# Patient Record
Sex: FEMALE | Race: WHITE
[De-identification: names, ages, dates, MRNs, and addresses within clinical notes are randomized per-mention and may not be internally consistent; named-entity substitution may affect disease eponyms.]

---

## 2017-03-29 ENCOUNTER — HOSPITAL ENCOUNTER (OUTPATIENT)
Dept: HOSPITAL 75 - RAD | Age: 37
End: 2017-03-29
Attending: OBSTETRICS & GYNECOLOGY
Payer: MEDICAID

## 2017-03-29 DIAGNOSIS — Z3A.35: ICD-10-CM

## 2017-03-29 DIAGNOSIS — O36.63X0: Primary | ICD-10-CM

## 2017-03-29 DIAGNOSIS — O09.523: ICD-10-CM

## 2017-03-29 PROCEDURE — 76805 OB US >/= 14 WKS SNGL FETUS: CPT

## 2017-03-29 NOTE — DIAGNOSTIC IMAGING REPORT
INDICATION: Assess growth.



A person gestation is transversely positioned head to the

maternal right. The placenta is fundal with no abruption or

previa. The amniotic fluid volume is within normal limits.

Cervix nondilated normal in length at 5.5 cm. Heart rate regular

at 147 beats per minute. Biometric measurements correlate with

an average age of 35 week 4 days. Age based on initial

ultrasound 34 week one day.



IMPRESSION:

Person gestation transversely positioned today measures 35

week 4 days. Nondilated cervix. No pathological finding.



Dictated by:



Dictated on workstation # [id number]

## 2017-04-26 ENCOUNTER — HOSPITAL ENCOUNTER (OUTPATIENT)
Dept: HOSPITAL 75 - RAD | Age: 37
End: 2017-04-26
Attending: OBSTETRICS & GYNECOLOGY
Payer: MEDICAID

## 2017-04-26 DIAGNOSIS — Z3A.38: ICD-10-CM

## 2017-04-26 DIAGNOSIS — O40.3XX1: Primary | ICD-10-CM

## 2017-04-26 PROCEDURE — 76805 OB US >/= 14 WKS SNGL FETUS: CPT

## 2017-04-26 PROCEDURE — 76819 FETAL BIOPHYS PROFIL W/O NST: CPT

## 2017-04-26 NOTE — DIAGNOSTIC IMAGING REPORT
OB ultrasound. Biophysical profile.



INDICATION: Advanced maternal age.



FINDINGS:

Fetal heart rate is 158 beats per minute. Fetal presentation is

cephalic. Total FRANK is 29.8 cm.



The growth parameters are:



Biparietal diameter:              37 weeks 6 days

Head circumference:           38 weeks 5 day

Abdominal circumference:  40 weeks 2 days

Femur length:                       38 weeks 1 day



These average at:                38 weeks and 6 days. This

correlates with a gestational age of 38 weeks and 1 day based on

the provided ETTA of 05/09/2017. The abdominal circumference is

at the 98th percentile.



Estimated fetal weight is 3.7 kg. There is question of

dilatation of the stomach based on image #6. This study was not

dedicated for evaluation of the stomach.



IMPRESSION: Polyhydramnios. There is question of dilated stomach

incidentally noted. This is, however, not well evaluated on this

exam which is not dedicated to evaluate fetal anatomy. In the

presence of polyhydramnios and dilated stomach, anomaly such as

duodenal atresia could be considered. Correlate clinically and

if needed with dedicated ultrasound study to evaluate the

abdominal structures.



Dictated by:



Dictated on workstation # [id number]

## 2017-05-01 ENCOUNTER — HOSPITAL ENCOUNTER (INPATIENT)
Dept: HOSPITAL 75 - LDRP | Age: 37
LOS: 2 days | Discharge: HOME | End: 2017-05-03
Attending: OBSTETRICS & GYNECOLOGY | Admitting: OBSTETRICS & GYNECOLOGY
Payer: MEDICAID

## 2017-05-01 VITALS — BODY MASS INDEX: 33.8 KG/M2 | WEIGHT: 198 LBS | HEIGHT: 64 IN

## 2017-05-01 VITALS — SYSTOLIC BLOOD PRESSURE: 114 MMHG | DIASTOLIC BLOOD PRESSURE: 85 MMHG

## 2017-05-01 DIAGNOSIS — Z3A.39: ICD-10-CM

## 2017-05-01 DIAGNOSIS — Z23: ICD-10-CM

## 2017-05-01 DIAGNOSIS — O36.63X0: ICD-10-CM

## 2017-05-01 DIAGNOSIS — O09.523: ICD-10-CM

## 2017-05-01 DIAGNOSIS — E03.9: ICD-10-CM

## 2017-05-01 DIAGNOSIS — O40.3XX0: Primary | ICD-10-CM

## 2017-05-01 DIAGNOSIS — O08.83: ICD-10-CM

## 2017-05-01 LAB
BASOPHILS # BLD AUTO: 0 10^3/UL (ref 0–0.1)
BASOPHILS NFR BLD AUTO: 0 % (ref 0–10)
BILIRUB UR QL STRIP: NEGATIVE
EOSINOPHIL # BLD AUTO: 0.2 10^3/UL (ref 0–0.3)
EOSINOPHIL NFR BLD AUTO: 1 % (ref 0–10)
ERYTHROCYTE [DISTWIDTH] IN BLOOD BY AUTOMATED COUNT: 12.4 % (ref 10–14.5)
KETONES UR QL STRIP: NEGATIVE
LEUKOCYTE ESTERASE UR QL STRIP: (no result)
LYMPHOCYTES # BLD AUTO: 2.9 X 10^3 (ref 1–4)
LYMPHOCYTES NFR BLD AUTO: 25 % (ref 12–44)
MCH RBC QN AUTO: 32 PG (ref 25–34)
MCHC RBC AUTO-ENTMCNC: 34 G/DL (ref 32–36)
MCV RBC AUTO: 93 FL (ref 80–99)
MONOCYTES # BLD AUTO: 0.8 X 10^3 (ref 0–1)
MONOCYTES NFR BLD AUTO: 6 % (ref 0–12)
NEUTROPHILS # BLD AUTO: 8.1 X 10^3 (ref 1.8–7.8)
NEUTROPHILS NFR BLD AUTO: 68 % (ref 42–75)
NITRITE UR QL STRIP: NEGATIVE
PH UR STRIP: 6 [PH] (ref 5–9)
PLATELET # BLD: 273 10^3/UL (ref 130–400)
PMV BLD AUTO: 11.8 FL (ref 7.4–10.4)
PROT UR QL STRIP: NEGATIVE
RBC # BLD AUTO: 3.86 10^6/UL (ref 4.35–5.85)
SP GR UR STRIP: 1.02 (ref 1.02–1.02)
SQUAMOUS #/AREA URNS HPF: (no result) /HPF
UROBILINOGEN UR-MCNC: NORMAL MG/DL
WBC # BLD AUTO: 12 10^3/UL (ref 4.3–11)
WBC #/AREA URNS HPF: (no result) /HPF

## 2017-05-01 PROCEDURE — 87077 CULTURE AEROBIC IDENTIFY: CPT

## 2017-05-01 PROCEDURE — 84443 ASSAY THYROID STIM HORMONE: CPT

## 2017-05-01 PROCEDURE — 85025 COMPLETE CBC W/AUTO DIFF WBC: CPT

## 2017-05-01 PROCEDURE — 90715 TDAP VACCINE 7 YRS/> IM: CPT

## 2017-05-01 PROCEDURE — 36415 COLL VENOUS BLD VENIPUNCTURE: CPT

## 2017-05-01 PROCEDURE — 82962 GLUCOSE BLOOD TEST: CPT

## 2017-05-01 PROCEDURE — 86900 BLOOD TYPING SEROLOGIC ABO: CPT

## 2017-05-01 PROCEDURE — 86901 BLOOD TYPING SEROLOGIC RH(D): CPT

## 2017-05-01 PROCEDURE — 3E0DXGC INTRODUCTION OF OTHER THERAPEUTIC SUBSTANCE INTO MOUTH AND PHARYNX, EXTERNAL APPROACH: ICD-10-PCS | Performed by: OBSTETRICS & GYNECOLOGY

## 2017-05-01 PROCEDURE — 86850 RBC ANTIBODY SCREEN: CPT

## 2017-05-01 PROCEDURE — 87186 SC STD MICRODIL/AGAR DIL: CPT

## 2017-05-01 PROCEDURE — 87088 URINE BACTERIA CULTURE: CPT

## 2017-05-01 PROCEDURE — 88307 TISSUE EXAM BY PATHOLOGIST: CPT

## 2017-05-01 PROCEDURE — 81000 URINALYSIS NONAUTO W/SCOPE: CPT

## 2017-05-01 RX ADMIN — SODIUM CHLORIDE, SODIUM LACTATE, POTASSIUM CHLORIDE, AND CALCIUM CHLORIDE SCH MLS/HR: 600; 310; 30; 20 INJECTION, SOLUTION INTRAVENOUS at 20:20

## 2017-05-02 VITALS — DIASTOLIC BLOOD PRESSURE: 60 MMHG | SYSTOLIC BLOOD PRESSURE: 98 MMHG

## 2017-05-02 VITALS — DIASTOLIC BLOOD PRESSURE: 65 MMHG | SYSTOLIC BLOOD PRESSURE: 110 MMHG

## 2017-05-02 VITALS — DIASTOLIC BLOOD PRESSURE: 65 MMHG | SYSTOLIC BLOOD PRESSURE: 112 MMHG

## 2017-05-02 VITALS — DIASTOLIC BLOOD PRESSURE: 70 MMHG | SYSTOLIC BLOOD PRESSURE: 117 MMHG

## 2017-05-02 VITALS — DIASTOLIC BLOOD PRESSURE: 54 MMHG | SYSTOLIC BLOOD PRESSURE: 92 MMHG

## 2017-05-02 VITALS — SYSTOLIC BLOOD PRESSURE: 102 MMHG | DIASTOLIC BLOOD PRESSURE: 69 MMHG

## 2017-05-02 VITALS — DIASTOLIC BLOOD PRESSURE: 82 MMHG | SYSTOLIC BLOOD PRESSURE: 115 MMHG

## 2017-05-02 VITALS — DIASTOLIC BLOOD PRESSURE: 63 MMHG | SYSTOLIC BLOOD PRESSURE: 105 MMHG

## 2017-05-02 VITALS — SYSTOLIC BLOOD PRESSURE: 103 MMHG | DIASTOLIC BLOOD PRESSURE: 70 MMHG

## 2017-05-02 VITALS — DIASTOLIC BLOOD PRESSURE: 73 MMHG | SYSTOLIC BLOOD PRESSURE: 111 MMHG

## 2017-05-02 VITALS — DIASTOLIC BLOOD PRESSURE: 65 MMHG | SYSTOLIC BLOOD PRESSURE: 105 MMHG

## 2017-05-02 VITALS — DIASTOLIC BLOOD PRESSURE: 76 MMHG | SYSTOLIC BLOOD PRESSURE: 112 MMHG

## 2017-05-02 VITALS — DIASTOLIC BLOOD PRESSURE: 65 MMHG | SYSTOLIC BLOOD PRESSURE: 100 MMHG

## 2017-05-02 VITALS — DIASTOLIC BLOOD PRESSURE: 69 MMHG | SYSTOLIC BLOOD PRESSURE: 97 MMHG

## 2017-05-02 VITALS — DIASTOLIC BLOOD PRESSURE: 61 MMHG | SYSTOLIC BLOOD PRESSURE: 103 MMHG

## 2017-05-02 VITALS — DIASTOLIC BLOOD PRESSURE: 69 MMHG | SYSTOLIC BLOOD PRESSURE: 112 MMHG

## 2017-05-02 VITALS — SYSTOLIC BLOOD PRESSURE: 102 MMHG | DIASTOLIC BLOOD PRESSURE: 56 MMHG

## 2017-05-02 VITALS — DIASTOLIC BLOOD PRESSURE: 59 MMHG | SYSTOLIC BLOOD PRESSURE: 107 MMHG

## 2017-05-02 VITALS — SYSTOLIC BLOOD PRESSURE: 101 MMHG | DIASTOLIC BLOOD PRESSURE: 67 MMHG

## 2017-05-02 VITALS — SYSTOLIC BLOOD PRESSURE: 100 MMHG | DIASTOLIC BLOOD PRESSURE: 65 MMHG

## 2017-05-02 VITALS — DIASTOLIC BLOOD PRESSURE: 69 MMHG | SYSTOLIC BLOOD PRESSURE: 118 MMHG

## 2017-05-02 VITALS — SYSTOLIC BLOOD PRESSURE: 104 MMHG | DIASTOLIC BLOOD PRESSURE: 62 MMHG

## 2017-05-02 VITALS — SYSTOLIC BLOOD PRESSURE: 112 MMHG | DIASTOLIC BLOOD PRESSURE: 77 MMHG

## 2017-05-02 VITALS — SYSTOLIC BLOOD PRESSURE: 111 MMHG | DIASTOLIC BLOOD PRESSURE: 71 MMHG

## 2017-05-02 VITALS — DIASTOLIC BLOOD PRESSURE: 70 MMHG | SYSTOLIC BLOOD PRESSURE: 99 MMHG

## 2017-05-02 VITALS — SYSTOLIC BLOOD PRESSURE: 123 MMHG | DIASTOLIC BLOOD PRESSURE: 83 MMHG

## 2017-05-02 VITALS — SYSTOLIC BLOOD PRESSURE: 118 MMHG | DIASTOLIC BLOOD PRESSURE: 85 MMHG

## 2017-05-02 VITALS — SYSTOLIC BLOOD PRESSURE: 103 MMHG | DIASTOLIC BLOOD PRESSURE: 64 MMHG

## 2017-05-02 VITALS — DIASTOLIC BLOOD PRESSURE: 70 MMHG | SYSTOLIC BLOOD PRESSURE: 110 MMHG

## 2017-05-02 VITALS — SYSTOLIC BLOOD PRESSURE: 82 MMHG | DIASTOLIC BLOOD PRESSURE: 44 MMHG

## 2017-05-02 VITALS — SYSTOLIC BLOOD PRESSURE: 104 MMHG | DIASTOLIC BLOOD PRESSURE: 69 MMHG

## 2017-05-02 VITALS — SYSTOLIC BLOOD PRESSURE: 121 MMHG | DIASTOLIC BLOOD PRESSURE: 72 MMHG

## 2017-05-02 VITALS — SYSTOLIC BLOOD PRESSURE: 100 MMHG | DIASTOLIC BLOOD PRESSURE: 64 MMHG

## 2017-05-02 VITALS — DIASTOLIC BLOOD PRESSURE: 72 MMHG | SYSTOLIC BLOOD PRESSURE: 107 MMHG

## 2017-05-02 VITALS — DIASTOLIC BLOOD PRESSURE: 69 MMHG | SYSTOLIC BLOOD PRESSURE: 106 MMHG

## 2017-05-02 VITALS — DIASTOLIC BLOOD PRESSURE: 67 MMHG | SYSTOLIC BLOOD PRESSURE: 125 MMHG

## 2017-05-02 RX ADMIN — IBUPROFEN SCH MG: 600 TABLET ORAL at 14:02

## 2017-05-02 RX ADMIN — IBUPROFEN SCH MG: 600 TABLET ORAL at 21:22

## 2017-05-02 RX ADMIN — AMOXICILLIN AND CLAVULANATE POTASSIUM SCH MG: 875; 125 TABLET, FILM COATED ORAL at 16:46

## 2017-05-02 RX ADMIN — WATER SCH MLS/HR: 1 INJECTION INTRAMUSCULAR; INTRAVENOUS; SUBCUTANEOUS at 01:22

## 2017-05-02 RX ADMIN — WATER SCH MLS/HR: 1 INJECTION INTRAMUSCULAR; INTRAVENOUS; SUBCUTANEOUS at 04:47

## 2017-05-02 RX ADMIN — SODIUM CHLORIDE, SODIUM LACTATE, POTASSIUM CHLORIDE, AND CALCIUM CHLORIDE SCH MLS/HR: 600; 310; 30; 20 INJECTION, SOLUTION INTRAVENOUS at 05:47

## 2017-05-02 RX ADMIN — WATER SCH MLS/HR: 1 INJECTION INTRAMUSCULAR; INTRAVENOUS; SUBCUTANEOUS at 09:27

## 2017-05-02 RX ADMIN — DOCUSATE SODIUM SCH MG: 100 CAPSULE ORAL at 21:22

## 2017-05-02 RX ADMIN — FENTANYL CITRATE PRN MCG: 50 INJECTION, SOLUTION INTRAMUSCULAR; INTRAVENOUS at 04:46

## 2017-05-02 RX ADMIN — FENTANYL CITRATE PRN MCG: 50 INJECTION, SOLUTION INTRAMUSCULAR; INTRAVENOUS at 01:22

## 2017-05-02 NOTE — DISCHARGE INST-WOMEN'S SERVICE
Discharge Inst-Women's Serv


Depart Medication/Instructions


New, Converted or Re-Newed RX:  RX on Chart


Final Diagnosis


polyhydramnios


LGA


AMA


induction at 39 weeks


vaginal delivery


epidural





Consults/Follow Up


Additional Follow Up:  Yes (2 weeks (if doing well can call to cancel this appt

) and 6 weeks)





Activity


Activity:  Activity as Tolerated


Driving Instructions:  You May Drive


NO SMOKING:  NO SMOKING


Nothing Inside Vagina:  No Douching, No Houck, No Tampons





Diet


Discharge Diet:  No Restrictions


Symptoms to Report to :  Swelling Increased, Bleeding Excessive, Fever Over 

101 Degrees F, Vaginal Bleeding Increase, Cramps in Feet or Legs, Vaginal 

Discharge Foul


For Any Problems or Questions:  Contact Your Physician





Skin/Wound Care


Bathing Instructions:  NELDA Hernandez DO May 2, 2017 11:43 am

## 2017-05-02 NOTE — OB LABOR & DELIVERY RECORD
Vag Delivery Note


Vag Delivery Note


Date of Delivery: 17 





Preoperative Diagnosis: Gallo Will  is a 35  /Para  4 /2 ,Gestational 

Age 39 weeks, GBS +, polyhydramnios (30), AMA, LGA


Postoperative Diagnosis: Same


Surgeon: NELDA MCKAY 








Anesthesia: epidrual





Delivery Type: vaginal 





Findings: []


Viable femal infant, apgars 8/9, weight 7#9oz


Lacerations:


Intact placenta with 3 vessel cord. No nuchal cord,  or shoulder dystocia.  The 

was a body/bandoleer cord over the left shoulder. 








Estimated Blood Loss: 150 ml





Complications: None





Condition: Stable





Description of Procedure:


The patient is a 36  /Para  4 /2 ,Gestational Age 39 weeks, GBS +, 

polyhydramnios (30), AMA, LGAwho presented for induction of labor on 17.  

She received oral misoprostel x 1 and ampicillin was started for GBS 

prophylaxis (she received 4 total doses). She was admitted and informed consent 

was obtained. Her labor course was remarkable for misoprostol x 1, Ampicillin x 

4 and then spontaneous labor.  She had epidural placement at approximately 0715 

am and was 7 cm dilated after the epidural.  She had resultant hypotension with 

late decelerations after the epidural placement.  She had AROM at 9 + cm with a 

large amount of fluid.   She progressed to complete dilatation and began to 

push. 





She was then set up for delivery. The infant's head was delivered 

atraumatically in the DANE position. The shoulders and remainder of the infant's 

body were then delivered without difficulty. There was a body cord that was 

delivered through.  Upon delivery, the head was held below the level of the 

perineum and the mouth and nares were bulb suctioned. The cord was doubly 

clamped and cut and the infant was handed off to the pediatric staff. An intact 

placenta with 3-vessel cord delivered via Charo and there was found to be 

minimal bleeding.  There was 1500 + ml of amniotic fluid that delivered after 

delivery of the baby.~ Vigorous fundal massage was performed and the fundus was 

found to be firm. IV oxytocin was given. Examination of the vagina and perineum 

revealed a superficial supraurethral laceration that was not repaired. 

Following delivery, sponge, instrument and needle counts were correct. Mom and 

baby were both in stable condition in the labor suite. placenta sent for 

pathology





Vitals - Labs


Vital Signs - I&O





Vital Signs








  Date Time  Temp Pulse Resp B/P (MAP) Pulse Ox O2 Delivery O2 Flow Rate FiO2


 


17 09:25  65 20 112/77 97 Room Air  


 


17 09:20  64 20 112/69 97 Room Air  


 


17 09:15  67 20 121/72 98 Room Air  


 


17 09:07  91 20 118/85 99 Room Air  


 


17 09:00  65 20 115/82 100 Room Air  


 


17 08:55  63 20 111/73 100 Room Air  


 


17 08:50  63 20 106/69 100 Room Air  


 


17 08:45  62 20 105/63 100 Room Air  


 


17 08:40  67 20 110/70 100 Room Air  


 


17 08:35  55 20 97/69 100 Room Air  


 


17 08:30  81 20 99/70 100 Room Air  


 


17 08:25  58 20 101/67 100 Room Air  


 


17 08:20  54 18 100/65 100 Room Air  


 


17 08:15  57 18 107/72 100 Room Air  


 


17 08:10  59 18 104/69 100 Room Air  


 


17 08:05  62 18 100/65 100 Room Air  


 


17 08:00 97.4 82 20 102/69  Room Air  


 


17 07:53  93 18 82/44 98 Room Air  


 


17 07:48  67 20 98/60 98 Room Air  


 


17 07:45  67 20 92/54 98 Room Air  


 


17 07:42  74 18 103/61 98 Room Air  


 


17 07:36  75 18 125/67 98 Room Air  


 


17 07:32  73 18 123/83 98 Room Air  


 


17 05:00 96.5 77  112/65    


 


17 01:30 96.6 79  100/64    


 


17 20:30 97.0 96 18 114/85    














I & O 


 


 17





 07:00


 


Intake Total 2100 ml


 


Balance 2100 ml











Labs


Laboratory Tests


17 20:20: 


White Blood Count 12.0H, Red Blood Count 3.86L, Hemoglobin 12.3, Hematocrit 36, 

Mean Corpuscular Volume 93, Mean Corpuscular Hemoglobin 32, Mean Corpuscular 

Hemoglobin Concent 34, Red Cell Distribution Width 12.4, Platelet Count 273, 

Mean Platelet Volume 11.8H, Neutrophils (%) (Auto) 68, Lymphocytes (%) (Auto) 25

, Monocytes (%) (Auto) 6, Eosinophils (%) (Auto) 1, Basophils (%) (Auto) 0, 

Neutrophils # (Auto) 8.1H, Lymphocytes # (Auto) 2.9, Monocytes # (Auto) 0.8, 

Eosinophils # (Auto) 0.2, Basophils # (Auto) 0.0, Urine Color YELLOW, Urine 

Clarity SLIGHTLY CLOUDY, Urine pH 6, Urine Specific Gravity 1.020, Urine 

Protein NEGATIVE, Urine Glucose (UA) NEGATIVE, Urine Ketones NEGATIVE, Urine 

Nitrite NEGATIVE, Urine Bilirubin NEGATIVE, Urine Urobilinogen NORMAL, Urine 

Leukocyte Esterase 3+H, Urine RBC (Auto) 1+H, Urine RBC 0-2, Urine WBC 25-50H, 

Urine Squamous Epithelial Cells 25-50H, Urine Crystals NONE, Urine Bacteria 

MODERATEH, Urine Casts NONE, Urine Mucus NEGATIVE, Urine Culture Indicated NO


17 20:36: Glucometer 92





Microbiology


17 Urine Culture - Preliminary, Resulted


         Probable Enterococcus Species











NELDA MCKAY DO May 2, 2017 11:37 am

## 2017-05-03 VITALS — SYSTOLIC BLOOD PRESSURE: 120 MMHG | DIASTOLIC BLOOD PRESSURE: 77 MMHG

## 2017-05-03 VITALS — SYSTOLIC BLOOD PRESSURE: 101 MMHG | DIASTOLIC BLOOD PRESSURE: 66 MMHG

## 2017-05-03 VITALS — SYSTOLIC BLOOD PRESSURE: 111 MMHG | DIASTOLIC BLOOD PRESSURE: 79 MMHG

## 2017-05-03 LAB
BASOPHILS # BLD AUTO: 0 10^3/UL (ref 0–0.1)
BASOPHILS NFR BLD AUTO: 0 % (ref 0–10)
EOSINOPHIL # BLD AUTO: 0.2 10^3/UL (ref 0–0.3)
EOSINOPHIL NFR BLD AUTO: 2 % (ref 0–10)
ERYTHROCYTE [DISTWIDTH] IN BLOOD BY AUTOMATED COUNT: 12.3 % (ref 10–14.5)
LYMPHOCYTES # BLD AUTO: 2.9 X 10^3 (ref 1–4)
LYMPHOCYTES NFR BLD AUTO: 25 % (ref 12–44)
MCH RBC QN AUTO: 32 PG (ref 25–34)
MCHC RBC AUTO-ENTMCNC: 34 G/DL (ref 32–36)
MCV RBC AUTO: 93 FL (ref 80–99)
MONOCYTES # BLD AUTO: 0.8 X 10^3 (ref 0–1)
MONOCYTES NFR BLD AUTO: 7 % (ref 0–12)
NEUTROPHILS # BLD AUTO: 7.8 X 10^3 (ref 1.8–7.8)
NEUTROPHILS NFR BLD AUTO: 66 % (ref 42–75)
PLATELET # BLD: 224 10^3/UL (ref 130–400)
PMV BLD AUTO: 10.9 FL (ref 7.4–10.4)
RBC # BLD AUTO: 3.29 10^6/UL (ref 4.35–5.85)
WBC # BLD AUTO: 11.8 10^3/UL (ref 4.3–11)

## 2017-05-03 RX ADMIN — DOCUSATE SODIUM SCH MG: 100 CAPSULE ORAL at 08:31

## 2017-05-03 RX ADMIN — IBUPROFEN SCH MG: 600 TABLET ORAL at 04:01

## 2017-05-03 RX ADMIN — IBUPROFEN SCH MG: 600 TABLET ORAL at 10:23

## 2017-05-03 RX ADMIN — AMOXICILLIN AND CLAVULANATE POTASSIUM SCH MG: 875; 125 TABLET, FILM COATED ORAL at 08:32

## 2017-05-03 NOTE — ANESTHESIA-REGIONAL POST-OP
Regional


Patient Condition


Mental Status:  Alert, Oriented x3


Circulation:  Same as Pre-Op


Headache:  Absent


Sensation:  Full Recovery


Motor Block:  Absent





Post Op Complications


Complications


None





Follow Up Care/Instructions


Patient Instructions


None needed.





Anesthesia/Patient Condition


Patient is doing well, no complaints, stable vital signs, no apparent adverse 

anesthesia problems.   


No complications reported per nursing.


D/C home per [facility] Criteria:  No











FREDA CRESPO CRNA May 3, 2017 15:00

## 2017-05-03 NOTE — POSTPARTUM PROGRESS NOTE
Postpartum Note


Postpartum Note


Postpartum Day # 1





Subjective:


Patient is without complaints. Ambulating, voiding. Tolerating a regular diet 

without nausea or vomiting. Normal lochia. Pain is well controlled with oral 

pain medications. Bottle feeding. Desires d/c home today.





Objective:





 VS - Last 72 Hours, by Label








 5/1/17 5/2/17 5/2/17 5/2/17





 20:30 01:30 05:00 07:32


 


Temp 97.0 96.6 96.5 


 


Pulse 96 79 77 73


 


Resp 18   18


 


B/P (MAP) 114/85 100/64 112/65 123/83


 


Pulse Ox    98


 


O2 Delivery    Room Air


 


    





 5/2/17 5/2/17 5/2/17 5/2/17





 07:36 07:42 07:45 07:48


 


Pulse 75 74 67 67


 


Resp 18 18 20 20


 


B/P (MAP) 125/67 103/61 92/54 98/60


 


Pulse Ox 98 98 98 98


 


O2 Delivery Room Air Room Air Room Air Room Air





 5/2/17 5/2/17 5/2/17 5/2/17





 07:53 08:00 08:05 08:10


 


Temp  97.4  


 


Pulse 93 82 62 59


 


Resp 18 20 18 18


 


B/P (MAP) 82/44 102/69 100/65 104/69


 


Pulse Ox 98  100 100


 


O2 Delivery Room Air Room Air Room Air Room Air


 


    





 5/2/17 5/2/17 5/2/17 5/2/17





 08:15 08:20 08:25 08:30


 


Pulse 57 54 58 81


 


Resp 18 18 20 20


 


B/P (MAP) 107/72 100/65 101/67 99/70


 


Pulse Ox 100 100 100 100


 


O2 Delivery Room Air Room Air Room Air Room Air





 5/2/17 5/2/17 5/2/17 5/2/17





 08:35 08:40 08:45 08:50


 


Pulse 55 67 62 63


 


Resp 20 20 20 20


 


B/P (MAP) 97/69 110/70 105/63 106/69


 


Pulse Ox 100 100 100 100


 


O2 Delivery Room Air Room Air Room Air Room Air





 5/2/17 5/2/17 5/2/17 5/2/17





 08:55 09:00 09:07 09:15


 


Pulse 63 65 91 67


 


Resp 20 20 20 20


 


B/P (MAP) 111/73 115/82 118/85 121/72


 


Pulse Ox 100 100 99 98


 


O2 Delivery Room Air Room Air Room Air Room Air





 5/2/17 5/2/17 5/2/17 5/2/17





 09:20 09:25 09:30 09:45


 


Pulse 64 65 80 62


 


Resp 20 20 18 18


 


B/P (MAP) 112/69 112/77 103/70 104/62


 


Pulse Ox 97 97 98 94


 


O2 Delivery Room Air Room Air Room Air Room Air





 5/2/17 5/2/17 5/2/17 5/2/17





 10:00 10:15 10:30 10:45


 


Pulse 62 69 75 74


 


Resp 20 20 20 20


 


B/P (MAP) 103/64 107/59 105/65 110/65


 


Pulse Ox 94 98 97 97


 


O2 Delivery Room Air Room Air Room Air Room Air





 5/2/17 5/2/17 5/2/17 5/2/17





 11:00 11:28 11:42 11:55


 


Temp  97.6 98.1 98.0


 


Pulse 72 75 80 84


 


Resp 20 20 20 18


 


B/P (MAP) 117/70 102/56 112/76 118/69


 


O2 Delivery Room Air Room Air Room Air Room Air


 


    





 5/2/17 5/2/17 5/3/17 5/3/17





 16:00 20:50 00:20 04:01


 


Temp 97.5 97.8 97.1 96.5


 


Pulse 81 73 65 59


 


Resp 14 18 18 18


 


B/P (MAP)  111/71 101/66 120/77


 


Pulse Ox 96 97 96 97


 


O2 Delivery  Room Air Room Air Room Air











Physical Exam:


General - Alert and oriented, no apparent distress


Abdomen - Soft, appropriately tender to palpation, non-distended, fundus firm 

at umbilicus


Extremities - no edema, negative Papo's bilaterally 








Laboratory Tests








Test


  5/3/17


06:30 Range/Units


 


 


White Blood Count


  11.8 H


  4.3-11.0


10^3/uL


 


Red Blood Count


  3.29 L


  4.35-5.85


10^6/uL


 


Hemoglobin 10.4 L 11.5-16.0  G/DL


 


Hematocrit 31 L 35-52  %


 


Mean Corpuscular Volume 93  80-99  FL


 


Mean Corpuscular Hemoglobin 32  25-34  PG


 


Mean Corpuscular Hemoglobin


Concent 34 


  32-36  G/DL


 


 


Red Cell Distribution Width 12.3  10.0-14.5  %


 


Platelet Count


  224 


  130-400


10^3/uL


 


Mean Platelet Volume 10.9 H 7.4-10.4  FL


 


Neutrophils (%) (Auto) 66  42-75  %


 


Lymphocytes (%) (Auto) 25  12-44  %


 


Monocytes (%) (Auto) 7  0-12  %


 


Eosinophils (%) (Auto) 2  0-10  %


 


Basophils (%) (Auto) 0  0-10  %


 


Neutrophils # (Auto) 7.8  1.8-7.8  X 10^3


 


Lymphocytes # (Auto) 2.9  1.0-4.0  X 10^3


 


Monocytes # (Auto) 0.8  0.0-1.0  X 10^3


 


Eosinophils # (Auto)


  0.2 


  0.0-0.3


10^3/uL


 


Basophils # (Auto)


  0.0 


  0.0-0.1


10^3/uL


 


Thyroid Stimulating Hormone


(TSH) 4.55 


  0.35-4.94


UIU/ML











Assessment:


37 y/o post-partum day # 1, status post spontaneous vaginal delivery following 

term IOL for polyhydramnios/AMA.


Recovering well, hemodynamically stable


Acute blood loss anemia Hgb 10.4


Rh+


Hypothyroidism





Plan:


Routine postpartum care.


Encourage breast feeding.


Encourage ambulation.


Ferrous sulfate supplementation.


Restart synthroid.


Plan for discharge today, f/u with Dr. Sheppard as per discharge instructions





Vitals - Labs


Vital Signs - I&O





Vital Signs








  Date Time  Temp Pulse Resp B/P (MAP) Pulse Ox O2 Delivery O2 Flow Rate FiO2


 


5/3/17 04:01 96.5 59 18 120/77 97 Room Air  


 


5/3/17 00:20 97.1 65 18 101/66 96 Room Air  


 


5/2/17 20:50 97.8 73 18 111/71 97 Room Air  


 


5/2/17 16:00 97.5 81 14  96   


 


5/2/17 11:55 98.0 84 18 118/69  Room Air  


 


5/2/17 11:42 98.1 80 20 112/76  Room Air  


 


5/2/17 11:28 97.6 75 20 102/56  Room Air  


 


5/2/17 11:00  72 20 117/70  Room Air  


 


5/2/17 10:45  74 20 110/65 97 Room Air  


 


5/2/17 10:30  75 20 105/65 97 Room Air  


 


5/2/17 10:15  69 20 107/59 98 Room Air  


 


5/2/17 10:00  62 20 103/64 94 Room Air  


 


5/2/17 09:45  62 18 104/62 94 Room Air  


 


5/2/17 09:30  80 18 103/70 98 Room Air  


 


5/2/17 09:25  65 20 112/77 97 Room Air  


 


5/2/17 09:20  64 20 112/69 97 Room Air  


 


5/2/17 09:15  67 20 121/72 98 Room Air  


 


5/2/17 09:07  91 20 118/85 99 Room Air  


 


5/2/17 09:00  65 20 115/82 100 Room Air  


 


5/2/17 08:55  63 20 111/73 100 Room Air  


 


5/2/17 08:50  63 20 106/69 100 Room Air  


 


5/2/17 08:45  62 20 105/63 100 Room Air  


 


5/2/17 08:40  67 20 110/70 100 Room Air  


 


5/2/17 08:35  55 20 97/69 100 Room Air  


 


5/2/17 08:30  81 20 99/70 100 Room Air  


 


5/2/17 08:25  58 20 101/67 100 Room Air  


 


5/2/17 08:20  54 18 100/65 100 Room Air  


 


5/2/17 08:15  57 18 107/72 100 Room Air  


 


5/2/17 08:10  59 18 104/69 100 Room Air  


 


5/2/17 08:05  62 18 100/65 100 Room Air  











Labs


Laboratory Tests


5/3/17 06:30: 


White Blood Count 11.8H, Red Blood Count 3.29L, Hemoglobin 10.4L, Hematocrit 31L

, Mean Corpuscular Volume 93, Mean Corpuscular Hemoglobin 32, Mean Corpuscular 

Hemoglobin Concent 34, Red Cell Distribution Width 12.3, Platelet Count 224, 

Mean Platelet Volume 10.9H, Neutrophils (%) (Auto) 66, Lymphocytes (%) (Auto) 25

, Monocytes (%) (Auto) 7, Eosinophils (%) (Auto) 2, Basophils (%) (Auto) 0, 

Neutrophils # (Auto) 7.8, Lymphocytes # (Auto) 2.9, Monocytes # (Auto) 0.8, 

Eosinophils # (Auto) 0.2, Basophils # (Auto) 0.0, Thyroid Stimulating Hormone (

TSH) 4.55





Microbiology


5/1/17 Urine Culture - Final, Complete


         Enterococcus Faecalis











DYLLAN HINTON MD May 3, 2017 08:02

## 2018-04-26 ENCOUNTER — HOSPITAL ENCOUNTER (OUTPATIENT)
Dept: HOSPITAL 75 - RAD | Age: 38
End: 2018-04-26
Attending: OBSTETRICS & GYNECOLOGY
Payer: MEDICAID

## 2018-04-26 DIAGNOSIS — Z36.89: Primary | ICD-10-CM

## 2018-04-26 DIAGNOSIS — Z3A.22: ICD-10-CM

## 2018-04-26 PROCEDURE — 76805 OB US >/= 14 WKS SNGL FETUS: CPT

## 2018-04-26 NOTE — DIAGNOSTIC IMAGING REPORT
INDICATION: Fetal survey.



TECHNIQUE: Multiple real-time grayscale images were obtained over

the gravid uterus.



COMPARISON: None



FINDINGS: There is a single live fetus in a cephalic

presentation. The placenta is posterior. The amniotic fluid

volume is normal. Fetal heart rate was recorded at 140 beats per

minute. Fetal survey demonstrates fetal kidneys, bladder, and

stomach to be unremarkable. Intracranial structures are

unremarkable. There is a four-chamber heart. There is a

three-vessel cord with normal cord insertion. Fetal spine is

limited in evaluation today due to fetal position.



Biometrical measurements are as follows:

Biparietal 5.03 cm, age 21 weeks 2 days.

Head circumference 19.05 cm, age 21 weeks 3 days.

Abdominal circumference 17.51 cm, age 22 weeks 4 days.

Femur length 3.82 cm, age 22 weeks 2 days.



Sonographic estimate age: 22 weeks 0 days.

Sonographic estimated date of delivery: 08/30/18.



Estimated Fetal Weight: 483 gm (+/- 71  gm).

LMP percentile: 98%.



Fetal heart rate: 140 beats per minute.



Fetal number: 1 of 1.



IMPRESSION: Single live IUP at 22 weeks 0 days gestational age

with an estimated date of confinement sonographically of

08/30/2018. Fetal survey is unremarkable, however, fetal spine is

limited in evaluation due to fetal position. Followup ultrasound

and later second or early third trimester could be performed for

further evaluation.



Dictated by: 



  Dictated on workstation # [id number]

## 2018-06-18 ENCOUNTER — HOSPITAL ENCOUNTER (OUTPATIENT)
Dept: HOSPITAL 75 - RAD | Age: 38
End: 2018-06-18
Attending: OBSTETRICS & GYNECOLOGY
Payer: MEDICAID

## 2018-06-18 DIAGNOSIS — Z3A.29: ICD-10-CM

## 2018-06-18 DIAGNOSIS — Z34.93: Primary | ICD-10-CM

## 2018-06-18 PROCEDURE — 76816 OB US FOLLOW-UP PER FETUS: CPT

## 2018-06-18 NOTE — DIAGNOSTIC IMAGING REPORT
INDICATION: Size and dates.



TECHNIQUE: Multiple real-time grayscale images were obtained over

the gravid uterus.



COMPARISON: 04/26/2018.



FINDINGS: There is a single living intrauterine pregnancy. The

biometry correlates with a gestational age of 29 weeks 5 days.

Heart rate is 138 beats per minute. The amniotic fluid index is

29.94. The sonographically estimated gestational weight is 1485

g.



IMPRESSION: Single living intrauterine pregnancy with

sonographically estimated gestational age of 29 weeks 5 days and

estimated date of confinement of August 29, 2018.



Polyhydramnios with an amniotic fluid index of 29.94.



Biometrical measurements are as follows:

Biparietal 7.48 cm, age 30 weeks 1 days.

Head circumference 26.60 cm, age 29 weeks 0 days.

Abdominal circumference 27.18 cm, age 31 weeks 2 days.

Femur length 5.29 cm, age 28 weeks 1 days.



Sonographic estimate age: 29 weeks 5 days.

Sonographic estimated date of delivery: 8/29/2018.



Estimated Fetal Weight: 1485 gm (+/- 217 gm).

LMP percentile: 98%.



Fetal heart rate: 138 beats per minute.



Fetal number: 1 of 1.



Dictated by: 



  Dictated on workstation # [id number]

## 2018-08-02 ENCOUNTER — HOSPITAL ENCOUNTER (OUTPATIENT)
Dept: HOSPITAL 75 - RAD | Age: 38
End: 2018-08-02
Attending: OBSTETRICS & GYNECOLOGY
Payer: MEDICAID

## 2018-08-02 DIAGNOSIS — O36.63X0: ICD-10-CM

## 2018-08-02 DIAGNOSIS — O24.410: Primary | ICD-10-CM

## 2018-08-02 DIAGNOSIS — O99.283: ICD-10-CM

## 2018-08-02 DIAGNOSIS — Z3A.37: ICD-10-CM

## 2018-08-02 DIAGNOSIS — O09.523: ICD-10-CM

## 2018-08-02 PROCEDURE — 76805 OB US >/= 14 WKS SNGL FETUS: CPT

## 2018-08-02 PROCEDURE — 76819 FETAL BIOPHYS PROFIL W/O NST: CPT

## 2018-08-02 NOTE — DIAGNOSTIC IMAGING REPORT
INDICATION: Gestational diabetes and large for dates.







TECHNIQUE: Multiple real-time grayscale images were obtained over

the gravid uterus.



COMPARISON: 06/18/2018



FINDINGS: Single live intrauterine fetus is seen measuring 37

weeks zero days by composite measurements. This is about 3 weeks

larger than expected from original dates. Fetus is in cephalic

presentation. Cervical length is 5.5 cm. The placenta is grade 2

and fundal in location with no evidence of previa. Fetal heart

rate is 139 beats per minute.



Biophysical profile score was 8 out of 8. Amniotic fluid index,

however was elevated at 34.6.



Biometrical measurements are as follows:

Biparietal 9.43 cm, age 38 weeks 3 days.

Head circumference 32.71 cm, age 37 weeks 1 days.

Abdominal circumference 34.06 cm, age 38 weeks 0 days.

Femur length 6.69 cm, age 34 weeks 3 days.



Sonographic estimate age: 37 weeks 0 days.

Sonographic estimated date of delivery: 8/23/2018.



Estimated Fetal Weight: 3116 gm (+/- 455  gm).

LMP percentile: 98%.



Fetal heart rate: 139 beats per minute.



Fetal number: 1 of 1.





IMPRESSION: 







Single live intrauterine fetus measuring 37 weeks zero days in

size with dates about 3 weeks larger than expected from previous

dates. There is polyhydramnios with FRANK of 34.6. Biophysical

profile score was 8 out of 8.



Dictated by: 



  Dictated on workstation # [id number]

## 2018-08-20 ENCOUNTER — HOSPITAL ENCOUNTER (OUTPATIENT)
Dept: HOSPITAL 75 - RAD | Age: 38
End: 2018-08-20
Attending: OBSTETRICS & GYNECOLOGY
Payer: MEDICAID

## 2018-08-20 DIAGNOSIS — Z3A.39: ICD-10-CM

## 2018-08-20 DIAGNOSIS — O40.3XX1: ICD-10-CM

## 2018-08-20 DIAGNOSIS — O24.410: Primary | ICD-10-CM

## 2018-08-20 DIAGNOSIS — O36.63X1: ICD-10-CM

## 2018-08-20 PROCEDURE — 76805 OB US >/= 14 WKS SNGL FETUS: CPT

## 2018-08-20 PROCEDURE — 76819 FETAL BIOPHYS PROFIL W/O NST: CPT

## 2018-08-20 NOTE — DIAGNOSTIC IMAGING REPORT
INDICATION: Gestational diabetes and evaluate growth.



TECHNIQUE: Multiple real-time grayscale images were obtained over

the gravid uterus.



COMPARISON: 08/02/2018.



FINDINGS: Single live fetus in a cephalic presentation is noted.

Fetal heart rate was recorded at 161 beats per minute. Placenta

is posterior. Amniotic fluid index remains elevated at 32 cm

compared with 35 cm on prior. Biophysical profile was performed

with a score of 8 out of 8.



Biometrical measurements are as follows:

Biparietal 9.66 cm, age 39 weeks 4 days.

Head circumference 34.6 cm, age 40 weeks 1 days.

Abdominal circumference 35.15 cm, age 39 weeks 1 days.

Femur length 7.05 cm, age 36 weeks 1 days.



Sonographic estimate age: 38 weeks 6 days.

Sonographic estimated date of delivery: 8/28/2018.



Estimated Fetal Weight: 3537 gm (+/- 517  gm).

LMP percentile: 95%.



Fetal heart rate: 161 beats per minute.



Fetal number: 1 of 1.



IMPRESSION: Single live IUP approximately 39 weeks gestational

age. There continues to be findings of polyhydramnios.

Biophysical profile score is 8 out of 8.



Dictated by: 



  Dictated on workstation # [id number]

## 2018-09-06 ENCOUNTER — HOSPITAL ENCOUNTER (INPATIENT)
Dept: HOSPITAL 75 - LDRP | Age: 38
LOS: 3 days | Discharge: HOME | End: 2018-09-09
Attending: OBSTETRICS & GYNECOLOGY | Admitting: OBSTETRICS & GYNECOLOGY
Payer: MEDICAID

## 2018-09-06 VITALS — DIASTOLIC BLOOD PRESSURE: 65 MMHG | SYSTOLIC BLOOD PRESSURE: 112 MMHG

## 2018-09-06 VITALS — DIASTOLIC BLOOD PRESSURE: 59 MMHG | SYSTOLIC BLOOD PRESSURE: 108 MMHG

## 2018-09-06 VITALS — SYSTOLIC BLOOD PRESSURE: 120 MMHG | DIASTOLIC BLOOD PRESSURE: 72 MMHG

## 2018-09-06 VITALS — HEIGHT: 64 IN | BODY MASS INDEX: 38.24 KG/M2 | WEIGHT: 224 LBS

## 2018-09-06 DIAGNOSIS — Z87.891: ICD-10-CM

## 2018-09-06 DIAGNOSIS — Z80.3: ICD-10-CM

## 2018-09-06 DIAGNOSIS — Z3A.37: ICD-10-CM

## 2018-09-06 DIAGNOSIS — E03.9: ICD-10-CM

## 2018-09-06 DIAGNOSIS — O99.820: ICD-10-CM

## 2018-09-06 LAB
BASOPHILS # BLD AUTO: 0 10^3/UL (ref 0–0.1)
BASOPHILS NFR BLD AUTO: 0 % (ref 0–10)
EOSINOPHIL # BLD AUTO: 0.2 10^3/UL (ref 0–0.3)
EOSINOPHIL NFR BLD AUTO: 2 % (ref 0–10)
ERYTHROCYTE [DISTWIDTH] IN BLOOD BY AUTOMATED COUNT: 12.6 % (ref 10–14.5)
HCT VFR BLD CALC: 31 % (ref 35–52)
HGB BLD-MCNC: 10.6 G/DL (ref 11.5–16)
LYMPHOCYTES # BLD AUTO: 2 X 10^3 (ref 1–4)
LYMPHOCYTES NFR BLD AUTO: 20 % (ref 12–44)
MANUAL DIFFERENTIAL PERFORMED BLD QL: NO
MCH RBC QN AUTO: 31 PG (ref 25–34)
MCHC RBC AUTO-ENTMCNC: 34 G/DL (ref 32–36)
MCV RBC AUTO: 90 FL (ref 80–99)
MONOCYTES # BLD AUTO: 0.7 X 10^3 (ref 0–1)
MONOCYTES NFR BLD AUTO: 7 % (ref 0–12)
NEUTROPHILS # BLD AUTO: 7.1 X 10^3 (ref 1.8–7.8)
NEUTROPHILS NFR BLD AUTO: 71 % (ref 42–75)
PLATELET # BLD: 240 10^3/UL (ref 130–400)
PMV BLD AUTO: 11.6 FL (ref 7.4–10.4)
RBC # BLD AUTO: 3.42 10^6/UL (ref 4.35–5.85)
WBC # BLD AUTO: 10 10^3/UL (ref 4.3–11)

## 2018-09-06 PROCEDURE — 85025 COMPLETE CBC W/AUTO DIFF WBC: CPT

## 2018-09-06 PROCEDURE — 90715 TDAP VACCINE 7 YRS/> IM: CPT

## 2018-09-06 PROCEDURE — 36415 COLL VENOUS BLD VENIPUNCTURE: CPT

## 2018-09-06 PROCEDURE — 86900 BLOOD TYPING SEROLOGIC ABO: CPT

## 2018-09-06 PROCEDURE — 82962 GLUCOSE BLOOD TEST: CPT

## 2018-09-06 PROCEDURE — 86901 BLOOD TYPING SEROLOGIC RH(D): CPT

## 2018-09-06 PROCEDURE — 82947 ASSAY GLUCOSE BLOOD QUANT: CPT

## 2018-09-06 PROCEDURE — 86850 RBC ANTIBODY SCREEN: CPT

## 2018-09-07 VITALS — DIASTOLIC BLOOD PRESSURE: 73 MMHG | SYSTOLIC BLOOD PRESSURE: 114 MMHG

## 2018-09-07 VITALS — DIASTOLIC BLOOD PRESSURE: 41 MMHG | SYSTOLIC BLOOD PRESSURE: 128 MMHG

## 2018-09-07 VITALS — DIASTOLIC BLOOD PRESSURE: 70 MMHG | SYSTOLIC BLOOD PRESSURE: 106 MMHG

## 2018-09-07 VITALS — SYSTOLIC BLOOD PRESSURE: 132 MMHG | DIASTOLIC BLOOD PRESSURE: 78 MMHG

## 2018-09-07 VITALS — DIASTOLIC BLOOD PRESSURE: 66 MMHG | SYSTOLIC BLOOD PRESSURE: 118 MMHG

## 2018-09-07 VITALS — DIASTOLIC BLOOD PRESSURE: 73 MMHG | SYSTOLIC BLOOD PRESSURE: 112 MMHG

## 2018-09-07 VITALS — DIASTOLIC BLOOD PRESSURE: 72 MMHG | SYSTOLIC BLOOD PRESSURE: 116 MMHG

## 2018-09-07 VITALS — DIASTOLIC BLOOD PRESSURE: 63 MMHG | SYSTOLIC BLOOD PRESSURE: 110 MMHG

## 2018-09-07 VITALS — SYSTOLIC BLOOD PRESSURE: 108 MMHG | DIASTOLIC BLOOD PRESSURE: 66 MMHG

## 2018-09-07 VITALS — SYSTOLIC BLOOD PRESSURE: 98 MMHG | DIASTOLIC BLOOD PRESSURE: 61 MMHG

## 2018-09-07 VITALS — SYSTOLIC BLOOD PRESSURE: 111 MMHG | DIASTOLIC BLOOD PRESSURE: 70 MMHG

## 2018-09-07 PROCEDURE — 3E0DXGC INTRODUCTION OF OTHER THERAPEUTIC SUBSTANCE INTO MOUTH AND PHARYNX, EXTERNAL APPROACH: ICD-10-PCS | Performed by: OBSTETRICS & GYNECOLOGY

## 2018-09-07 PROCEDURE — 0HQ9XZZ REPAIR PERINEUM SKIN, EXTERNAL APPROACH: ICD-10-PCS | Performed by: OBSTETRICS & GYNECOLOGY

## 2018-09-07 RX ADMIN — DOCUSATE SODIUM SCH MG: 100 CAPSULE ORAL at 20:45

## 2018-09-07 RX ADMIN — IBUPROFEN SCH MG: 600 TABLET ORAL at 14:45

## 2018-09-07 RX ADMIN — DOCUSATE SODIUM SCH MG: 100 CAPSULE ORAL at 08:59

## 2018-09-07 RX ADMIN — IBUPROFEN SCH MG: 600 TABLET ORAL at 08:58

## 2018-09-07 RX ADMIN — IBUPROFEN SCH MG: 600 TABLET ORAL at 20:45

## 2018-09-07 RX ADMIN — VITAMIN A ACETATE, .BETA.-CAROTENE, ASCORBIC ACID, CHOLECALCIFEROL, .ALPHA.-TOCOPHEROL ACETATE, DL-, THIAMINE MONONITRATE, RIBOFLAVIN, NIACINAMIDE, PYRIDOXINE HYDROCHLORIDE, FOLIC ACID, CYANOCOBALAMIN, CALCIUM CARBONATE, FERROUS FUMARATE, ZINC OXIDE, AND CUPRIC OXIDE SCH EA: 2000; 2000; 120; 400; 22; 1.84; 3; 20; 10; 1; 12; 200; 27; 25; 2 TABLET ORAL at 08:59

## 2018-09-07 RX ADMIN — WATER SCH MLS/HR: 1 INJECTION INTRAMUSCULAR; INTRAVENOUS; SUBCUTANEOUS at 06:06

## 2018-09-07 RX ADMIN — FERROUS SULFATE TAB 325 MG (65 MG ELEMENTAL FE) SCH MG: 325 (65 FE) TAB at 08:59

## 2018-09-07 RX ADMIN — WATER SCH MLS/HR: 1 INJECTION INTRAMUSCULAR; INTRAVENOUS; SUBCUTANEOUS at 01:55

## 2018-09-07 NOTE — OB LABOR & DELIVERY RECORD
Vag Delivery Note


Vag Delivery Note


Date of Delivery: 18 





Preoperative Diagnosis: Gallo Will  is a (37  /Para   / ,Gestational 

Age (wks)39





Postoperative Diagnosis: Same


Surgeon: STEPH BERMUDEZ 





Anesthesia: None





Delivery Type: Spontaneous vaginal





Findings:


Viable male infant, weight 8#8





Complications: None





Condition: Stable





Description of Procedure:





The patient is a G4 who presented for IOL. She was admitted and informed 

consent was obtained. Her labor course was remarkable for precipitous delivery. 

She progressed to complete dilatation and began to push. 





The infant's head was delivered atraumatically in the DANE position through 

tight nuchal cord. The shoulders and remainder of the infant's body were then 

delivered without difficulty. Upon delivery, the head was held below the level 

of the perineum and the cord was doubly clamped and cut and the infant was 

placed on maternal abdomen. At this point, Dr. Sheppard arrived and took over for 

the delivery of placenta and repair.





Vitals - Labs


Vital Signs - I&O





Vital Signs








  Date Time  Temp Pulse Resp B/P (MAP) Pulse Ox O2 Delivery O2 Flow Rate FiO2


 


18 02:30      Room Air  


 


18 01:55 98.1 77 18 111/70 (84)  Room Air  


 


18 01:30      Room Air  


 


18 01:00      Room Air  


 


18 00:30      Room Air  


 


18 00:00      Room Air  


 


18 23:30 98.0 74 18   Room Air  


 


18 23:30    112/65 (81)    


 


18 23:15      Room Air 0.00 


 


18 23:00      Non Rebreather 15.00 


 


18 22:52  73 18 108/59 (75)  Non Rebreather 15.00 


 


18 22:45      Non Rebreather 15.00 


 


18 20:10 98.4 78 18 120/72 (88)  Room Air  











Labs


Laboratory Tests


18 20:30: 


White Blood Count 10.0, Red Blood Count 3.42L, Hemoglobin 10.6L, Hematocrit 31L

, Mean Corpuscular Volume 90, Mean Corpuscular Hemoglobin 31, Mean Corpuscular 

Hemoglobin Concent 34, Red Cell Distribution Width 12.6, Platelet Count 240, 

Mean Platelet Volume 11.6H, Neutrophils (%) (Auto) 71, Lymphocytes (%) (Auto) 20

, Monocytes (%) (Auto) 7, Eosinophils (%) (Auto) 2, Basophils (%) (Auto) 0, 

Neutrophils # (Auto) 7.1, Lymphocytes # (Auto) 2.0, Monocytes # (Auto) 0.7, 

Eosinophils # (Auto) 0.2, Basophils # (Auto) 0.0











STEPH BERMUDEZ MD Sep 7, 2018 06:41

## 2018-09-07 NOTE — OPERATIVE REPORT
Operative Report


Date of Procedure/Surgery


Sep 7, 2018


Surgeon (s)


NELDA MCKAY DO


Assistant (s):  None





Post-Operative Diagnosis





Precipitous delivery vaginal





Procedure Performed





Placental delivery and repair of perineal laceration





Description of Procedure


Anesthesia Type:  Block (Local with lidocaine)


Estimated blood loss (mL):  see delivery note


Specimen(s) collected/removed


placenta not sent for pathology


Description of the Procedure


I was called for delivery of infant at 0607 am.  At 555 I had been notified 

that she was dilated 6 cm with bulging membranes and that they were calling for 

epidural placement.  at 0607 am i was notified that patient was feeling pushy 

and had cervix all around but bulging membranes.  i made my way to the hospital 

but was stopped for the end of a train.  i arrived at 0616 to hear the cry of 

the baby that had just been delivered.  Anesthesia had just arrived.  See 

delivery note by Dr. López who was in the hospital checking on a patient. I 

then delivered the placenta and there was good hemostasis with pitocin.  There 

was a first degree laceration that I repaired with lidocaine anesthesia and 3-0 

Vicryl rapide in standard fashion.  Mother and baby in stable condition in the 

delivery room.


apgars pending.  Birthweight 8#8oz.  Gestational diabetes A2.  Advanced 

maternal age > 35. GBS positive.  Received > 2 doses of ampicillin


Findings of the Procedure


see above





Allergies and Home Medications


Allergies


Coded Allergies:  


     No Known Drug Allergies (Verified  Allergy, Unknown, 3/1/10)





Home Medications


Amoxicillin/Potassium Clav 1 Each Tablet, 1 EACH PO BID


   Prescribed by: NELDA MCKAY on 5/2/17 1151


Ibuprofen 600 Mg Tablet, 600 MG PO Q6H


   Prescribed by: NELDA MCKAY on 5/2/17 1151


Levothyroxine Sodium 75 Mcg Tablet, 75 MCG PO DAILY, (Reported)





Patient Home Medication List


Home Medication List Reviewed:  NELDA Galeas DO Sep 7, 2018 06:45

## 2018-09-08 VITALS — SYSTOLIC BLOOD PRESSURE: 122 MMHG | DIASTOLIC BLOOD PRESSURE: 76 MMHG

## 2018-09-08 VITALS — DIASTOLIC BLOOD PRESSURE: 74 MMHG | SYSTOLIC BLOOD PRESSURE: 110 MMHG

## 2018-09-08 VITALS — SYSTOLIC BLOOD PRESSURE: 121 MMHG | DIASTOLIC BLOOD PRESSURE: 77 MMHG

## 2018-09-08 VITALS — SYSTOLIC BLOOD PRESSURE: 110 MMHG | DIASTOLIC BLOOD PRESSURE: 75 MMHG

## 2018-09-08 LAB
BASOPHILS # BLD AUTO: 0 10^3/UL (ref 0–0.1)
BASOPHILS NFR BLD AUTO: 0 % (ref 0–10)
EOSINOPHIL # BLD AUTO: 0.3 10^3/UL (ref 0–0.3)
EOSINOPHIL NFR BLD AUTO: 3 % (ref 0–10)
ERYTHROCYTE [DISTWIDTH] IN BLOOD BY AUTOMATED COUNT: 12.8 % (ref 10–14.5)
HCT VFR BLD CALC: 31 % (ref 35–52)
HGB BLD-MCNC: 10.7 G/DL (ref 11.5–16)
LYMPHOCYTES # BLD AUTO: 2.6 X 10^3 (ref 1–4)
LYMPHOCYTES NFR BLD AUTO: 24 % (ref 12–44)
MANUAL DIFFERENTIAL PERFORMED BLD QL: NO
MCH RBC QN AUTO: 31 PG (ref 25–34)
MCHC RBC AUTO-ENTMCNC: 34 G/DL (ref 32–36)
MCV RBC AUTO: 91 FL (ref 80–99)
MONOCYTES # BLD AUTO: 0.7 X 10^3 (ref 0–1)
MONOCYTES NFR BLD AUTO: 7 % (ref 0–12)
NEUTROPHILS # BLD AUTO: 7.3 X 10^3 (ref 1.8–7.8)
NEUTROPHILS NFR BLD AUTO: 67 % (ref 42–75)
PLATELET # BLD: 241 10^3/UL (ref 130–400)
PMV BLD AUTO: 10.7 FL (ref 7.4–10.4)
RBC # BLD AUTO: 3.45 10^6/UL (ref 4.35–5.85)
WBC # BLD AUTO: 11 10^3/UL (ref 4.3–11)

## 2018-09-08 RX ADMIN — IBUPROFEN SCH MG: 600 TABLET ORAL at 03:30

## 2018-09-08 RX ADMIN — DOCUSATE SODIUM SCH MG: 100 CAPSULE ORAL at 08:16

## 2018-09-08 RX ADMIN — IBUPROFEN SCH MG: 600 TABLET ORAL at 22:39

## 2018-09-08 RX ADMIN — IBUPROFEN SCH MG: 600 TABLET ORAL at 16:01

## 2018-09-08 RX ADMIN — VITAMIN A ACETATE, .BETA.-CAROTENE, ASCORBIC ACID, CHOLECALCIFEROL, .ALPHA.-TOCOPHEROL ACETATE, DL-, THIAMINE MONONITRATE, RIBOFLAVIN, NIACINAMIDE, PYRIDOXINE HYDROCHLORIDE, FOLIC ACID, CYANOCOBALAMIN, CALCIUM CARBONATE, FERROUS FUMARATE, ZINC OXIDE, AND CUPRIC OXIDE SCH EA: 2000; 2000; 120; 400; 22; 1.84; 3; 20; 10; 1; 12; 200; 27; 25; 2 TABLET ORAL at 08:16

## 2018-09-08 RX ADMIN — FERROUS SULFATE TAB 325 MG (65 MG ELEMENTAL FE) SCH MG: 325 (65 FE) TAB at 08:16

## 2018-09-08 RX ADMIN — DOCUSATE SODIUM SCH MG: 100 CAPSULE ORAL at 19:58

## 2018-09-08 RX ADMIN — IBUPROFEN SCH MG: 600 TABLET ORAL at 08:16

## 2018-09-08 NOTE — POSTPARTUM PROGRESS NOTE
Postpartum Note


Postpartum Note


Postpartum Day # 1 s/p 








Subjective:


Patient is without complaints. Ambulating, voiding. Tolerating a regular diet 

without nausea or vomiting. Normal lochia. Pain is well controlled with oral 

pain medications.  breast feeding





Objective:





Laboratory Tests








Test


 18


19:41 18


05:22 18


10:51 Range/Units


 


 


Glucometer 117 H  112 H   MG/DL


 


White Blood Count


 


 11.0 


 


 4.3-11.0


10^3/uL


 


Red Blood Count


 


 3.45 L


 


 4.35-5.85


10^6/uL


 


Hemoglobin  10.7 L  11.5-16.0  G/DL


 


Hematocrit  31 L  35-52  %


 


Mean Corpuscular Volume  91   80-99  FL


 


Mean Corpuscular Hemoglobin  31   25-34  PG


 


Mean Corpuscular Hemoglobin


Concent 


 34 


 


 32-36  G/DL





 


Red Cell Distribution Width  12.8   10.0-14.5  %


 


Platelet Count


 


 241 


 


 130-400


10^3/uL


 


Mean Platelet Volume  10.7 H  7.4-10.4  FL


 


Neutrophils (%) (Auto)  67   42-75  %


 


Lymphocytes (%) (Auto)  24   12-44  %


 


Monocytes (%) (Auto)  7   0-12  %


 


Eosinophils (%) (Auto)  3   0-10  %


 


Basophils (%) (Auto)  0   0-10  %


 


Neutrophils # (Auto)  7.3   1.8-7.8  X 10^3


 


Lymphocytes # (Auto)  2.6   1.0-4.0  X 10^3


 


Monocytes # (Auto)  0.7   0.0-1.0  X 10^3


 


Eosinophils # (Auto)


 


 0.3 


 


 0.0-0.3


10^3/uL


 


Basophils # (Auto)


 


 0.0 


 


 0.0-0.1


10^3/uL


 


Glucose Level  78     MG/DL

















 18





 03:29 08:13


 


Temp 98.4 98.8


 


Pulse 64 67


 


Resp 18 16


 


B/P (MAP) 110/74 (86) 121/77 (92)


 


Pulse Ox 98 97


 


O2 Delivery Room Air Room Air








Physical Exam:


General - Alert and oriented, no apparent distress


Abdomen - Soft, appropriately tender to palpation, non-distended, fundus firm 

at umbilicus


Extremities - no edema, negative Papo's bilaterally 








Assessment:


1. post-partum day # 1, status post spont vaginal delivery.


Recovering well, hemodynamically stable


2.  gestational diabetes, A1














Plan:


Routine postpartum care.


Encourage breast feeding.


Encourage ambulation.


Ferrous sulfate supplementation.


Plan for discharge []





Vitals - Labs


Vital Signs - I&O





Vital Signs








  Date Time  Temp Pulse Resp B/P (MAP) Pulse Ox O2 Delivery O2 Flow Rate FiO2


 


18 08:13 98.8 67 16 121/77 (92) 97 Room Air  


 


18 03:29 98.4 64 18 110/74 (86) 98 Room Air  


 


18 19:52 98.4 77 18 116/72 (87) 96 Room Air  


 


18 14:55 97.8 79 18 112/73 (86)  Room Air  











Labs


Laboratory Tests


18 19:41: Glucometer 117H


18 05:22: 


White Blood Count 11.0, Red Blood Count 3.45L, Hemoglobin 10.7L, Hematocrit 31L

, Mean Corpuscular Volume 91, Mean Corpuscular Hemoglobin 31, Mean Corpuscular 

Hemoglobin Concent 34, Red Cell Distribution Width 12.8, Platelet Count 241, 

Mean Platelet Volume 10.7H, Neutrophils (%) (Auto) 67, Lymphocytes (%) (Auto) 24

, Monocytes (%) (Auto) 7, Eosinophils (%) (Auto) 3, Basophils (%) (Auto) 0, 

Neutrophils # (Auto) 7.3, Lymphocytes # (Auto) 2.6, Monocytes # (Auto) 0.7, 

Eosinophils # (Auto) 0.3, Basophils # (Auto) 0.0, Glucose Level 78


18 10:51: Glucometer 112H











NELDA MCKAY DO Sep 8, 2018 13:39

## 2018-09-08 NOTE — DISCHARGE INST-WOMEN'S SERVICE
Discharge Inst-Women's Serv


Depart Medication/Instructions


New, Converted or Re-Newed RX:  Call to Patients Pharmacy


Final Diagnosis


gestational diabetes


Group b strep positive


Advanced maternal age, multiparous, > 35





Consults/Follow Up


Additional Follow Up:  Yes





Activity


Activity:  Activity as Tolerated


Driving Instructions:  You May Drive


NO SMOKING:  NO SMOKING


Nothing Inside Vagina:  No Douching, No [place], No Tampons





Diet


Discharge Diet:  No Restrictions


Symptoms to Report to :  Bleeding Excessive, Pain Increased, Fever Over 101 

Degrees F, Pain/Pressure in Jaw, Vaginal Bleeding Increase, Cramps in Feet or 

Legs, Vaginal Discharge Foul


For Any Problems or Questions:  Contact Your Physician





Skin/Wound Care


Infection Signs and Symptoms:  Increased Redness, Foul Odor of Wound, Increased 

Drainage, Skin Itchy or Has a Rash, Increased Swelling, Temperature Above 101  F











NELDA MCKAY DO Sep 8, 2018 13:43

## 2018-09-09 VITALS — DIASTOLIC BLOOD PRESSURE: 68 MMHG | SYSTOLIC BLOOD PRESSURE: 117 MMHG

## 2018-09-09 VITALS — SYSTOLIC BLOOD PRESSURE: 132 MMHG | DIASTOLIC BLOOD PRESSURE: 81 MMHG

## 2018-09-09 RX ADMIN — IBUPROFEN SCH MG: 600 TABLET ORAL at 04:15

## 2018-09-09 RX ADMIN — DOCUSATE SODIUM SCH MG: 100 CAPSULE ORAL at 09:16

## 2018-09-09 RX ADMIN — FERROUS SULFATE TAB 325 MG (65 MG ELEMENTAL FE) SCH MG: 325 (65 FE) TAB at 09:17

## 2018-09-09 RX ADMIN — IBUPROFEN SCH MG: 600 TABLET ORAL at 09:16

## 2018-09-09 RX ADMIN — VITAMIN A ACETATE, .BETA.-CAROTENE, ASCORBIC ACID, CHOLECALCIFEROL, .ALPHA.-TOCOPHEROL ACETATE, DL-, THIAMINE MONONITRATE, RIBOFLAVIN, NIACINAMIDE, PYRIDOXINE HYDROCHLORIDE, FOLIC ACID, CYANOCOBALAMIN, CALCIUM CARBONATE, FERROUS FUMARATE, ZINC OXIDE, AND CUPRIC OXIDE SCH EA: 2000; 2000; 120; 400; 22; 1.84; 3; 20; 10; 1; 12; 200; 27; 25; 2 TABLET ORAL at 09:16

## 2020-11-12 ENCOUNTER — HOSPITAL ENCOUNTER (OUTPATIENT)
Dept: HOSPITAL 75 - RAD | Age: 40
End: 2020-11-12
Attending: NURSE PRACTITIONER
Payer: MEDICAID

## 2020-11-12 DIAGNOSIS — Z34.92: Primary | ICD-10-CM

## 2020-11-12 DIAGNOSIS — Z3A.19: ICD-10-CM

## 2020-11-12 PROCEDURE — 76805 OB US >/= 14 WKS SNGL FETUS: CPT

## 2020-11-12 NOTE — DIAGNOSTIC IMAGING REPORT
INDICATION: Fetal anatomy survey.



TECHNIQUE: Multiple real-time grayscale images were obtained over

the gravid uterus.



COMPARISON: None



FINDINGS: The cervix is closed and measures 4.7 cm in length.

Placenta is posterior in position and there is no previa. Fetus

is in transverse lie with head to maternal left. The amount of

amniotic fluid appears visually appropriate.



Fetal anatomy survey was performed and the following structures

are visualized and normal: Kidneys, spine, urinary bladder,

three-vessel cord, cerebellum and cisterna magna, cerebral

ventricles, umbilical cord insertion and all four extremities.

Four-chamber heart was not well visualized.



Biometrical measurements are as follows:

Biparietal 4.46 cm, age 19 weeks 4 days.

Head circumference 16.42 cm, age 19 weeks 2 days.

Abdominal circumference 13.67 cm, age 19 weeks 1 days.

Femur length 3.16 cm, age 19 weeks 6 days.



Sonographic estimate age: 19 weeks 4 days.

Sonographic estimated date of delivery: 04/04/2021.



Estimated Fetal Weight: 292 gm (+/- 43  gm).

LMP percentile: 13%.



Fetal heart rate: 156 beats per minute.



Fetal number: 1 of 1.



IMPRESSION: 

1. Single live intrauterine pregnancy with an estimated

gestational age of 19 weeks and 14 days.

2. Fetal anatomy survey is normal with the exception of inability

to visualize the heart. Consider follow-up targeted ultrasound to

more fully evaluate the heart.



Dictated by: 



  Dictated on workstation # [id number]

## 2021-03-19 ENCOUNTER — HOSPITAL ENCOUNTER (INPATIENT)
Dept: HOSPITAL 75 - LDRP | Age: 41
LOS: 1 days | Discharge: HOME | End: 2021-03-20
Attending: OBSTETRICS & GYNECOLOGY | Admitting: OBSTETRICS & GYNECOLOGY
Payer: MEDICAID

## 2021-03-19 VITALS — DIASTOLIC BLOOD PRESSURE: 63 MMHG | SYSTOLIC BLOOD PRESSURE: 140 MMHG

## 2021-03-19 VITALS — SYSTOLIC BLOOD PRESSURE: 110 MMHG | DIASTOLIC BLOOD PRESSURE: 64 MMHG

## 2021-03-19 VITALS — SYSTOLIC BLOOD PRESSURE: 110 MMHG | DIASTOLIC BLOOD PRESSURE: 62 MMHG

## 2021-03-19 VITALS — SYSTOLIC BLOOD PRESSURE: 114 MMHG | DIASTOLIC BLOOD PRESSURE: 66 MMHG

## 2021-03-19 VITALS — SYSTOLIC BLOOD PRESSURE: 134 MMHG | DIASTOLIC BLOOD PRESSURE: 69 MMHG

## 2021-03-19 VITALS — SYSTOLIC BLOOD PRESSURE: 71 MMHG | DIASTOLIC BLOOD PRESSURE: 38 MMHG

## 2021-03-19 VITALS — SYSTOLIC BLOOD PRESSURE: 109 MMHG | DIASTOLIC BLOOD PRESSURE: 70 MMHG

## 2021-03-19 VITALS — DIASTOLIC BLOOD PRESSURE: 65 MMHG | SYSTOLIC BLOOD PRESSURE: 115 MMHG

## 2021-03-19 VITALS — SYSTOLIC BLOOD PRESSURE: 117 MMHG | DIASTOLIC BLOOD PRESSURE: 71 MMHG

## 2021-03-19 VITALS — DIASTOLIC BLOOD PRESSURE: 32 MMHG | SYSTOLIC BLOOD PRESSURE: 59 MMHG

## 2021-03-19 VITALS — SYSTOLIC BLOOD PRESSURE: 106 MMHG | DIASTOLIC BLOOD PRESSURE: 64 MMHG

## 2021-03-19 VITALS — SYSTOLIC BLOOD PRESSURE: 118 MMHG | DIASTOLIC BLOOD PRESSURE: 81 MMHG

## 2021-03-19 VITALS — BODY MASS INDEX: 38.65 KG/M2 | WEIGHT: 226.41 LBS | HEIGHT: 64.02 IN

## 2021-03-19 VITALS — DIASTOLIC BLOOD PRESSURE: 61 MMHG | SYSTOLIC BLOOD PRESSURE: 103 MMHG

## 2021-03-19 VITALS — DIASTOLIC BLOOD PRESSURE: 77 MMHG | SYSTOLIC BLOOD PRESSURE: 113 MMHG

## 2021-03-19 VITALS — SYSTOLIC BLOOD PRESSURE: 123 MMHG | DIASTOLIC BLOOD PRESSURE: 79 MMHG

## 2021-03-19 VITALS — SYSTOLIC BLOOD PRESSURE: 95 MMHG | DIASTOLIC BLOOD PRESSURE: 65 MMHG

## 2021-03-19 VITALS — SYSTOLIC BLOOD PRESSURE: 123 MMHG | DIASTOLIC BLOOD PRESSURE: 76 MMHG

## 2021-03-19 VITALS — SYSTOLIC BLOOD PRESSURE: 99 MMHG | DIASTOLIC BLOOD PRESSURE: 58 MMHG

## 2021-03-19 VITALS — SYSTOLIC BLOOD PRESSURE: 113 MMHG | DIASTOLIC BLOOD PRESSURE: 62 MMHG

## 2021-03-19 VITALS — DIASTOLIC BLOOD PRESSURE: 57 MMHG | SYSTOLIC BLOOD PRESSURE: 102 MMHG

## 2021-03-19 VITALS — SYSTOLIC BLOOD PRESSURE: 124 MMHG | DIASTOLIC BLOOD PRESSURE: 89 MMHG

## 2021-03-19 VITALS — DIASTOLIC BLOOD PRESSURE: 74 MMHG | SYSTOLIC BLOOD PRESSURE: 116 MMHG

## 2021-03-19 VITALS — DIASTOLIC BLOOD PRESSURE: 59 MMHG | SYSTOLIC BLOOD PRESSURE: 109 MMHG

## 2021-03-19 VITALS — SYSTOLIC BLOOD PRESSURE: 92 MMHG | DIASTOLIC BLOOD PRESSURE: 51 MMHG

## 2021-03-19 VITALS — DIASTOLIC BLOOD PRESSURE: 71 MMHG | SYSTOLIC BLOOD PRESSURE: 118 MMHG

## 2021-03-19 VITALS — SYSTOLIC BLOOD PRESSURE: 88 MMHG | DIASTOLIC BLOOD PRESSURE: 42 MMHG

## 2021-03-19 VITALS — DIASTOLIC BLOOD PRESSURE: 64 MMHG | SYSTOLIC BLOOD PRESSURE: 108 MMHG

## 2021-03-19 VITALS — SYSTOLIC BLOOD PRESSURE: 113 MMHG | DIASTOLIC BLOOD PRESSURE: 77 MMHG

## 2021-03-19 VITALS — DIASTOLIC BLOOD PRESSURE: 34 MMHG | SYSTOLIC BLOOD PRESSURE: 66 MMHG

## 2021-03-19 VITALS — DIASTOLIC BLOOD PRESSURE: 41 MMHG | SYSTOLIC BLOOD PRESSURE: 74 MMHG

## 2021-03-19 VITALS — SYSTOLIC BLOOD PRESSURE: 110 MMHG | DIASTOLIC BLOOD PRESSURE: 85 MMHG

## 2021-03-19 VITALS — DIASTOLIC BLOOD PRESSURE: 61 MMHG | SYSTOLIC BLOOD PRESSURE: 101 MMHG

## 2021-03-19 VITALS — DIASTOLIC BLOOD PRESSURE: 42 MMHG | SYSTOLIC BLOOD PRESSURE: 72 MMHG

## 2021-03-19 VITALS — DIASTOLIC BLOOD PRESSURE: 84 MMHG | SYSTOLIC BLOOD PRESSURE: 99 MMHG

## 2021-03-19 VITALS — SYSTOLIC BLOOD PRESSURE: 98 MMHG | DIASTOLIC BLOOD PRESSURE: 59 MMHG

## 2021-03-19 VITALS — SYSTOLIC BLOOD PRESSURE: 102 MMHG | DIASTOLIC BLOOD PRESSURE: 75 MMHG

## 2021-03-19 VITALS — SYSTOLIC BLOOD PRESSURE: 97 MMHG | DIASTOLIC BLOOD PRESSURE: 71 MMHG

## 2021-03-19 VITALS — SYSTOLIC BLOOD PRESSURE: 82 MMHG | DIASTOLIC BLOOD PRESSURE: 47 MMHG

## 2021-03-19 VITALS — DIASTOLIC BLOOD PRESSURE: 80 MMHG | SYSTOLIC BLOOD PRESSURE: 117 MMHG

## 2021-03-19 VITALS — SYSTOLIC BLOOD PRESSURE: 119 MMHG | DIASTOLIC BLOOD PRESSURE: 59 MMHG

## 2021-03-19 VITALS — DIASTOLIC BLOOD PRESSURE: 59 MMHG | SYSTOLIC BLOOD PRESSURE: 108 MMHG

## 2021-03-19 VITALS — DIASTOLIC BLOOD PRESSURE: 78 MMHG | SYSTOLIC BLOOD PRESSURE: 113 MMHG

## 2021-03-19 VITALS — SYSTOLIC BLOOD PRESSURE: 106 MMHG | DIASTOLIC BLOOD PRESSURE: 59 MMHG

## 2021-03-19 VITALS — SYSTOLIC BLOOD PRESSURE: 100 MMHG | DIASTOLIC BLOOD PRESSURE: 58 MMHG

## 2021-03-19 VITALS — SYSTOLIC BLOOD PRESSURE: 106 MMHG | DIASTOLIC BLOOD PRESSURE: 60 MMHG

## 2021-03-19 VITALS — SYSTOLIC BLOOD PRESSURE: 106 MMHG | DIASTOLIC BLOOD PRESSURE: 57 MMHG

## 2021-03-19 VITALS — SYSTOLIC BLOOD PRESSURE: 116 MMHG | DIASTOLIC BLOOD PRESSURE: 71 MMHG

## 2021-03-19 DIAGNOSIS — D62: ICD-10-CM

## 2021-03-19 DIAGNOSIS — O40.3XX0: Primary | ICD-10-CM

## 2021-03-19 DIAGNOSIS — Z3A.38: ICD-10-CM

## 2021-03-19 DIAGNOSIS — O09.523: ICD-10-CM

## 2021-03-19 LAB
APTT PPP: YELLOW S
BACTERIA #/AREA URNS HPF: (no result) /HPF
BASOPHILS # BLD AUTO: 0 10^3/UL (ref 0–0.1)
BASOPHILS NFR BLD AUTO: 0 % (ref 0–10)
BILIRUB UR QL STRIP: NEGATIVE
EOSINOPHIL # BLD AUTO: 0.2 10^3/UL (ref 0–0.3)
EOSINOPHIL NFR BLD AUTO: 2 % (ref 0–10)
FIBRINOGEN PPP-MCNC: (no result) MG/DL
GLUCOSE UR STRIP-MCNC: NEGATIVE MG/DL
HCT VFR BLD CALC: 31 % (ref 35–52)
HGB BLD-MCNC: 10.6 G/DL (ref 11.5–16)
KETONES UR QL STRIP: NEGATIVE
LEUKOCYTE ESTERASE UR QL STRIP: (no result)
LYMPHOCYTES # BLD AUTO: 2.8 10^3/UL (ref 1–4)
LYMPHOCYTES NFR BLD AUTO: 30 % (ref 12–44)
MANUAL DIFFERENTIAL PERFORMED BLD QL: NO
MCH RBC QN AUTO: 31 PG (ref 25–34)
MCHC RBC AUTO-ENTMCNC: 34 G/DL (ref 32–36)
MCV RBC AUTO: 91 FL (ref 80–99)
MONOCYTES # BLD AUTO: 0.6 10^3/UL (ref 0–1)
MONOCYTES NFR BLD AUTO: 6 % (ref 0–12)
NEUTROPHILS # BLD AUTO: 5.6 10^3/UL (ref 1.8–7.8)
NEUTROPHILS NFR BLD AUTO: 61 % (ref 42–75)
NITRITE UR QL STRIP: NEGATIVE
PH UR STRIP: 6 [PH] (ref 5–9)
PLATELET # BLD: 243 10^3/UL (ref 130–400)
PMV BLD AUTO: 10.7 FL (ref 9–12.2)
PROT UR QL STRIP: NEGATIVE
RBC #/AREA URNS HPF: (no result) /HPF
SP GR UR STRIP: 1.01 (ref 1.02–1.02)
WBC # BLD AUTO: 9.2 10^3/UL (ref 4.3–11)
WBC #/AREA URNS HPF: (no result) /HPF

## 2021-03-19 PROCEDURE — 86901 BLOOD TYPING SEROLOGIC RH(D): CPT

## 2021-03-19 PROCEDURE — 86900 BLOOD TYPING SEROLOGIC ABO: CPT

## 2021-03-19 PROCEDURE — 85025 COMPLETE CBC W/AUTO DIFF WBC: CPT

## 2021-03-19 PROCEDURE — 82947 ASSAY GLUCOSE BLOOD QUANT: CPT

## 2021-03-19 PROCEDURE — 86850 RBC ANTIBODY SCREEN: CPT

## 2021-03-19 PROCEDURE — 87088 URINE BACTERIA CULTURE: CPT

## 2021-03-19 PROCEDURE — 94640 AIRWAY INHALATION TREATMENT: CPT

## 2021-03-19 PROCEDURE — 36415 COLL VENOUS BLD VENIPUNCTURE: CPT

## 2021-03-19 PROCEDURE — 81000 URINALYSIS NONAUTO W/SCOPE: CPT

## 2021-03-19 RX ADMIN — DOCUSATE SODIUM SCH MG: 100 CAPSULE ORAL at 21:28

## 2021-03-19 RX ADMIN — SODIUM CHLORIDE SCH MLS/HR: 900 INJECTION, SOLUTION INTRAVENOUS at 11:56

## 2021-03-19 RX ADMIN — KETOROLAC TROMETHAMINE SCH MG: 30 INJECTION, SOLUTION INTRAMUSCULAR; INTRAVENOUS at 15:00

## 2021-03-19 RX ADMIN — KETOROLAC TROMETHAMINE SCH MG: 30 INJECTION, SOLUTION INTRAMUSCULAR; INTRAVENOUS at 21:29

## 2021-03-19 RX ADMIN — SODIUM CHLORIDE SCH MLS/HR: 900 INJECTION, SOLUTION INTRAVENOUS at 07:37

## 2021-03-19 NOTE — CESAREAN SECTION OPERATIVE
Procedure


 Procedure Note


Pre-operative Diagnosis: Gallo Will  is a 40  /Para  6 / 4,Gestational 

Age 38 1/7 weeks, non reassuring fetal status remote from delivery, advanced 

maternal age, polyhydramnios, gestational diabetes A1


Post-operative Diagnosis: same 


Procedure: Primary low transverse  section


Physician: NELDA MCKAY 


Estimated blood loss:  250 mL


Disposition:  stable; baby transferred to the nursery with pediatric staff





Findings:


Viable female infant, Apgars pending, weight 6# 14 ounces, intact placenta, 3vc,

normal appearing uterus, tubes, and ovaries.





Indications:Gallo Will  is a 40  /Para  6 / 4, Gestational Age 38 1/7 

weeks, non reassuring fetal status remote from delivery, advanced maternal age, 

polyhydramnios, gestational diabetes A1


Incision 14:33, Delivery 14:35


Procedure Details:


The patient was seen in her room and the procedure was discussed with the 

patient in full, including the risks, benefits, and alternatives. All questions 

were answered. The patient was taken to the operating room and a time out was 

performed, verifying patient and procedure.


After epidural anesthesia was redosed by our anesthesia colleagues, the patient 

was placed in the dorsal supine with leftward tilt for uterine displacement.~ 

Her abdomen was then prepped and draped in the typical sterile fashion. A 

Pfannenstiel skin incision was made using a scalpel and carried down through the

underlying fascia. The fascia was incised in the midline and tented up using 

Kocher clamps. On both the inferior and superior fascia side the rectus muscle 

was dissected off bluntly and sharply using Jones scissors. The peritoneum was 

identified and entered bluntly in the midline. This was then stretched laterally

using manual strength. After entering the abdominal cavity and confirming lack 

of intraperitoneal adhesions, an extra- large Francisco retractor was placed and 

the lower uterine segment was visualized.  A scalpel was utilized to make a low 

transverse uterine incision. Minimal clear fluid was noted (Patient had 9 lbs 

measured fluid after ROM). The infant's head was grasped and brought to the 

level of the incision. Fundal pressure was applied and infant was delivered 

without difficulty. Mouth and nares were suctioned with bulb suction. Infant was

floppy but cried at delivery. After the umbilical cord was clamped and cut, the 

infant was handed off to the pediatric staff. A sample of cord blood was then 

obtained.  Cord Gas was taken.


The placenta was delivered intact via uterine massage. The uterus was 

exteriorized and cleared of all clots and debris. The uterine incision was 

closed using 0 Vicryl in a running locked fashion. A second imbricated layer was

placed using 0 Vicryl in a running fashion as well. There was an extension 

toward the left uterine vessels so a figure of eight stitch was placed. The 

uterus was flexed forward and the posterior rectouterine space was inspected and

cleared of all clots and debris. Pelvis was irrigated.  Again the hysterotomy 

site was examined and hemostasis was observed. The bilateral tubes and ovaries 

appeared normal. The uterus was placed back into the abdominal cavity and 

abdominal gutters were cleared of all clots and debris.  A final check of the 

uterine incision showed it to be hemostatic. The peritoneum was closed using 3-0

Vicryl in a running fashion. A loose figure of eight stitch was placed to 

reapproximate the muscles.  The fascia was closed with 0 PDS in a running 

fashion. The subcutaneous space was hemostatic, and irrigated. The subcutaneous 

space was closed with 3-0 Plain in several single interrupted stitches. The skin

was then closed using 4-0 Monocryl in a running subcuticular fashion. The skin 

edges were reapproximated together and were hemostatic. A pressure dressing was 

applied. All sponge, lap and needle counts were correct at the end of the 

procedure per nursing.





Vitals - Labs


Vital Signs - I&O





Vital Signs








  Date Time  Temp Pulse Resp B/P (MAP) Pulse Ox O2 Delivery O2 Flow Rate FiO2


 


3/19/21 10:45  85 20 108/59 (75) 98 Room Air  


 


3/19/21 10:30 36.3 101 20 140/63 (88) 98 Room Air  


 


3/19/21 10:15  85 20 114/66 (82) 98 Room Air  


 


3/19/21 10:00  88 20 99/58 (72) 97 Room Air  


 


3/19/21 09:45  86 20 113/62 (79) 98 Room Air  


 


3/19/21 09:30 36.4 81 20 115/65 (82) 99 Room Air  


 


3/19/21 09:15  87 20 106/60 (75) 98 Room Air  


 


3/19/21 09:06  104 20 71/38 (49)    


 


3/19/21 09:04  113 20 74/41 (52)    


 


3/19/21 09:03  103 20 82/47 (59)    


 


3/19/21 09:00  89 20 110/64 (79) 98 Room Air  


 


3/19/21 08:55  68 20 117/71 (86) 96   


 


3/19/21 08:53  79 20 92/51 (65)    


 


3/19/21 08:52  93 20 88/42 (57)    


 


3/19/21 08:50  81 20 59/32 (41) 99   


 


3/19/21 08:48  77 20 66/34 (45)    


 


3/19/21 08:46  83 20 72/42 (52) 97   


 


3/19/21 08:45  96 20 98/59 (72) 97 Room Air  


 


3/19/21 08:40  86 20 116/71 (86) 97 Room Air  


 


3/19/21 08:38  81 20 109/70 (83) 97 Room Air  


 


3/19/21 08:37  82 20 110/85 (93) 96 Room Air  


 


3/19/21 08:36  82 20 113/78 (90) 95 Room Air  


 


3/19/21 08:30  77 20 100/58 (72) 96 Room Air  


 


3/19/21 08:20  74 20 123/76 (92) 96 Room Air  


 


3/19/21 08:15  78 20 124/89 (101)  Room Air  


 


3/19/21 08:13  78 20 124/89 (101) 97 Room Air  


 


3/19/21 08:00  80 20 123/79 (94)  Room Air  


 


3/19/21 07:45 36.2 85 20 113/77 (89) 98 Room Air  


 


3/19/21 07:38 36.2 85 20  98 Room Air  











Labs


Laboratory Tests


3/19/21 06:20: 


Urine Color YELLOW, Urine Clarity CLOUDY, Urine pH 6.0, Urine Specific Gravity 

1.015L, Urine Protein NEGATIVE, Urine Glucose (UA) NEGATIVE, Urine Ketones 

NEGATIVE, Urine Nitrite NEGATIVE, Urine Bilirubin NEGATIVE, Urine Urobilinogen 

0.2, Urine Leukocyte Esterase 3+H, Urine RBC (Auto) 1+H, Urine RBC 5-10H, Urine 

WBC TNTCH, Urine Squamous Epithelial Cells 10-25H, Urine Crystals NONE, Urine 

Bacteria MODERATEH, Urine Casts NONE, Urine Mucus NEGATIVE, Urine Culture 

Indicated YES


3/19/21 06:44: 


White Blood Count 9.2, Red Blood Count 3.39L, Hemoglobin 10.6L, Hematocrit 31L, 

Mean Corpuscular Volume 91, Mean Corpuscular Hemoglobin 31, Mean Corpuscular 

Hemoglobin Concent 34, Red Cell Distribution Width 12.3, Platelet Count 243, 

Mean Platelet Volume 10.7, Immature Granulocyte % (Auto) 0, Neutrophils (%) 

(Auto) 61, Lymphocytes (%) (Auto) 30, Monocytes (%) (Auto) 6, Eosinophils (%) 

(Auto) 2, Basophils (%) (Auto) 0, Neutrophils # (Auto) 5.6, Lymphocytes # (Auto)

2.8, Monocytes # (Auto) 0.6, Eosinophils # (Auto) 0.2, Basophils # (Auto) 0.0, 

Immature Granulocyte # (Auto) 0.0, Glucose Level 82











NELDA MCKAY DO               Mar 19, 2021 15:16

## 2021-03-19 NOTE — HISTORY & PHYSICAL-OB
OB - Chief Complaint & HPI


Date/Time


Date of Admission:


Date of Admission:  Mar 19, 2021 at 06:05


Date seen by a Provider:  Mar 19, 2021


Time Seen by a Provider:  08:45





Chief Complaint/History


OB-Reason for Admission/Chief:  Induction of Labor ( at 38 1/7 week,  GBM 

A1, polyhydramnios, Advanced maternal age)


Hx :  6


Hx Para:  4


Expected Date of Delivery:  Mar 31, 2021


Gestational Age in Weeks:  38


Gestational Age in Days:  1


Indication for induction:  medical complication (GDM, AMA, polyhydramnios)


Admission Nurse Assessment Rev:  Yes


History of Labs


VDRL NR


HbSAg -


Hep C -


HIV -


Rub I


VDRL NR


B +


Other


40 year old  at 38 1/7 weeks.  Admitted for IOL due to polyhydramnios >> 30 

cm, GDM A and Advanced maternal age (40)


Fetal testing has been wnl.





Allergies and Home Medications


Allergies


Coded Allergies:  


     No Known Drug Allergies (Verified  Allergy, Unknown, 3/1/10)





Home Medications


Apl815/FA/Omega3/Dha/Fish Oil 1 Each Tab.chew, 2 EACH PO DAILY, (Reported)





Patient Home Medication List


Home Medication List Reviewed:  Yes





OB - History


Hx of Present Pregnancy


Ultrasounds:  Normal mid trimester US


Obstetrical Complications:  Gestational Diabetes, Other (polyhydramnios)


Medical Complications:  Other (Advanced maternal age)





Prenatal Information


Pregnancy Induced Hypertension:  No


Maternal Gestational Diabetes:  Yes


Postpartum Hemorrhage:  No





Obstetrical History


Hx :  6


Hx Para:  4


Hx # Term Pregnancies:  4


Hx #  Pregnancies:  0


Number of Living Children:  4


Hx Pregnancy Termination:  No


Hx Total # of Abortions (Spona:  1


Hx Multiple Gestation:  No


Hx Stillbirth:  No


Hx Pregnancy Complication:  No


Hx Pregnancy Induced Hypertens:  No


Hx Maternal Gestational Diabet:  Yes





Delivery History


Hx Dystocia:  No


Hx Forceps Assisted Delivery:  No


Hx Vacuum Extraction Assisted:  No


Hx Placenta Abnormality:  No


Hx Fetal Distress:  No


Hx Large For Gestational Age I:  Yes


Hx Small for Gestational Age I:  No


Hx  Section:  No


Hx Blood Disorders:  No


Adverse Rxn to Tranfusion:  No





Patient Past Medical History





NC





Social History/Family History


Alcohol Use:  Rarely Uses


Recreational Drug Use:  No





Immunizations


Hepatitis B:  Yes


Tetanus Booster (TDap):  Less than 5yrs


Rubella:  immune


RPR/VDRL:  Negative


GBS Status:  Negative


HBsAG:  Negative





OB - Admission Exam


Physical Exam


HEENT:  NCAT


Heart:  Rhythm Normal


Lungs:  Clear


Abdomen:  Gravid (HF 45 cm)


Extremities:  Normal


Reflexes:  Normal


Cervical Dilatation:  4cm


Effacement:  50%


Station:  Ballotable


Fetal Heart Rate:  140's


Accelerations:  Accelerations Present


Decelerations:  No Decelerations


Short Term Variability:  Present


Long Term Variability:  Average (6-25)


Contractions on Admission:  6-10 Minutes Apart


Labs





Laboratory Tests








Test


 3/19/21


06:20 3/19/21


06:44 Range/Units


 


 


Urine Color YELLOW    


 


Urine Clarity CLOUDY    


 


Urine pH 6.0   5-9  


 


Urine Specific Gravity 1.015 L  1.016-1.022  


 


Urine Protein NEGATIVE   NEGATIVE  


 


Urine Glucose (UA) NEGATIVE   NEGATIVE  


 


Urine Ketones NEGATIVE   NEGATIVE  


 


Urine Nitrite NEGATIVE   NEGATIVE  


 


Urine Bilirubin NEGATIVE   NEGATIVE  


 


Urine Urobilinogen 0.2   < = 1.0  MG/DL


 


Urine Leukocyte Esterase 3+ H  NEGATIVE  


 


Urine RBC (Auto) 1+ H  NEGATIVE  


 


Urine RBC 5-10 H   /HPF


 


Urine WBC TNTC H   /HPF


 


Urine Squamous Epithelial


Cells 10-25 H


 


  /HPF





 


Urine Crystals NONE    /LPF


 


Urine Bacteria MODERATE H   /HPF


 


Urine Casts NONE    /LPF


 


Urine Mucus NEGATIVE    /LPF


 


Urine Culture Indicated YES    


 


White Blood Count


 


 9.2 


 4.3-11.0


10^3/uL


 


Red Blood Count


 


 3.39 L


 3.80-5.11


10^6/uL


 


Hemoglobin  10.6 L 11.5-16.0  g/dL


 


Hematocrit  31 L 35-52  %


 


Mean Corpuscular Volume  91  80-99  fL


 


Mean Corpuscular Hemoglobin  31  25-34  pg


 


Mean Corpuscular Hemoglobin


Concent 


 34 


 32-36  g/dL





 


Red Cell Distribution Width  12.3  10.0-14.5  %


 


Platelet Count


 


 243 


 130-400


10^3/uL


 


Mean Platelet Volume  10.7  9.0-12.2  fL


 


Immature Granulocyte % (Auto)  0   %


 


Neutrophils (%) (Auto)  61  42-75  %


 


Lymphocytes (%) (Auto)  30  12-44  %


 


Monocytes (%) (Auto)  6  0-12  %


 


Eosinophils (%) (Auto)  2  0-10  %


 


Basophils (%) (Auto)  0  0-10  %


 


Neutrophils # (Auto)


 


 5.6 


 1.8-7.8


10^3/uL


 


Lymphocytes # (Auto)


 


 2.8 


 1.0-4.0


10^3/uL


 


Monocytes # (Auto)


 


 0.6 


 0.0-1.0


10^3/uL


 


Eosinophils # (Auto)


 


 0.2 


 0.0-0.3


10^3/uL


 


Basophils # (Auto)


 


 0.0 


 0.0-0.1


10^3/uL


 


Immature Granulocyte # (Auto)


 


 0.0 


 0.0-0.1


10^3/uL


 


Glucose Level  82    MG/DL











OB - Assessment/Plan/Diagnosis


Assessment


Assessment:  induction of labor


Admission Dx


Gestational diabetes, Polyhydramnios, Advanced maternal age at 38 1/7 weeks


Presents for induction of labor.  GBS -


Plan controlled AROM/pitocin





anticipate 


Admission Status:  Inpatient Order (span 2 midnights)


Reason for Inpatient Admission:  


induction of labor





Plan


Plan:  Induction


Induction Method:  per Pitocin Protocol











NELDA MCKAY DO               Mar 19, 2021 09:09

## 2021-03-19 NOTE — PROGRESS NOTE
Progress Note


Assessment/Plan


Date Seen by Provider:  Mar 19, 2021


Time Seen by Provider:  14:00


Events since last exam


Patient was admitted and routine admit protocol was followed.  FSBS 82


She had epidural placement and then had resultant hypotension causing nausea, 

lightheadedness so Iv ephedrine was given by RN.  this was repeated once


AROM with FSE with clear fluid but head still balottable.  


Pitocin was started and nice contraction pattern was achieved.  


However, after ROM there were initially some variable decelerations that 

resolved spontaneously.





however, these began again and were deeper and slower to recovery.  Pitocin was 

stopped and O2 was administered.  She was positioned in differing positions that

did help the strip for short periods of time.  Cervix was 5 cm but head was 

still not engaged.


Presentation was checked on several occasions and head was always vertex.


FSE was placed.


She continued to contract irregularly after the pitocin was stopped.  


Despite measures to improved the fetal status, there continued to be 

decelerations but now these appeared late and had very slow recovery.  In 

addition the variability was minimal to none.


She remained 5 cm dilated and head was still balottable





Due to non reassuring fetal status, the decision was made to proceed with 

primary  section.


Risks and benefits were discussed with the patient and consent was obtained.


She has a working epidural for anesthesia.





Ancef 2 grams and azithromycin 500 mg IV was given.


Assessment/Plan


1.  non reassuring fetal status, remote from delivery


2.  Advanced maternal age > 39


3.  Polyhydramnios


4. Gestational diabetes A1





Plan primary  section now





Vitals


Last set of Vitals Signs





Vital Signs








  Date Time  Temp Pulse Resp B/P (MAP) Pulse Ox O2 Delivery O2 Flow Rate FiO2


 


3/19/21 10:45  85 20 108/59 (75) 98 Room Air  


 


3/19/21 10:30 36.3       











Labs


Laboratory Tests


3/19/21 06:20: 


Urine Color YELLOW, Urine Clarity CLOUDY, Urine pH 6.0, Urine Specific Gravity 

1.015L, Urine Protein NEGATIVE, Urine Glucose (UA) NEGATIVE, Urine Ketones 

NEGATIVE, Urine Nitrite NEGATIVE, Urine Bilirubin NEGATIVE, Urine Urobilinogen 

0.2, Urine Leukocyte Esterase 3+H, Urine RBC (Auto) 1+H, Urine RBC 5-10H, Urine 

WBC TNTCH, Urine Squamous Epithelial Cells 10-25H, Urine Crystals NONE, Urine 

Bacteria MODERATEH, Urine Casts NONE, Urine Mucus NEGATIVE, Urine Culture 

Indicated YES


3/19/21 06:44: 


White Blood Count 9.2, Red Blood Count 3.39L, Hemoglobin 10.6L, Hematocrit 31L, 

Mean Corpuscular Volume 91, Mean Corpuscular Hemoglobin 31, Mean Corpuscular 

Hemoglobin Concent 34, Red Cell Distribution Width 12.3, Platelet Count 243, 

Mean Platelet Volume 10.7, Immature Granulocyte % (Auto) 0, Neutrophils (%) 

(Auto) 61, Lymphocytes (%) (Auto) 30, Monocytes (%) (Auto) 6, Eosinophils (%) 

(Auto) 2, Basophils (%) (Auto) 0, Neutrophils # (Auto) 5.6, Lymphocytes # (Auto)

2.8, Monocytes # (Auto) 0.6, Eosinophils # (Auto) 0.2, Basophils # (Auto) 0.0, 

Immature Granulocyte # (Auto) 0.0, Glucose Level 82











NELDA MCKAY DO               Mar 19, 2021 14:11

## 2021-03-20 VITALS — SYSTOLIC BLOOD PRESSURE: 117 MMHG | DIASTOLIC BLOOD PRESSURE: 75 MMHG

## 2021-03-20 VITALS — SYSTOLIC BLOOD PRESSURE: 107 MMHG | DIASTOLIC BLOOD PRESSURE: 64 MMHG

## 2021-03-20 VITALS — DIASTOLIC BLOOD PRESSURE: 62 MMHG | SYSTOLIC BLOOD PRESSURE: 110 MMHG

## 2021-03-20 LAB
BASOPHILS # BLD AUTO: 0 10^3/UL (ref 0–0.1)
BASOPHILS NFR BLD AUTO: 0 % (ref 0–10)
EOSINOPHIL # BLD AUTO: 0 10^3/UL (ref 0–0.3)
EOSINOPHIL NFR BLD AUTO: 0 % (ref 0–10)
HCT VFR BLD CALC: 23 % (ref 35–52)
HGB BLD-MCNC: 8 G/DL (ref 11.5–16)
LYMPHOCYTES # BLD AUTO: 2.6 10^3/UL (ref 1–4)
LYMPHOCYTES NFR BLD AUTO: 16 % (ref 12–44)
MANUAL DIFFERENTIAL PERFORMED BLD QL: NO
MCH RBC QN AUTO: 32 PG (ref 25–34)
MCHC RBC AUTO-ENTMCNC: 34 G/DL (ref 32–36)
MCV RBC AUTO: 94 FL (ref 80–99)
MONOCYTES # BLD AUTO: 1.2 10^3/UL (ref 0–1)
MONOCYTES NFR BLD AUTO: 8 % (ref 0–12)
NEUTROPHILS # BLD AUTO: 12.1 10^3/UL (ref 1.8–7.8)
NEUTROPHILS NFR BLD AUTO: 76 % (ref 42–75)
PLATELET # BLD: 191 10^3/UL (ref 130–400)
PMV BLD AUTO: 11.5 FL (ref 9–12.2)
WBC # BLD AUTO: 16.1 10^3/UL (ref 4.3–11)

## 2021-03-20 RX ADMIN — KETOROLAC TROMETHAMINE SCH MG: 30 INJECTION, SOLUTION INTRAMUSCULAR; INTRAVENOUS at 03:10

## 2021-03-20 RX ADMIN — DOCUSATE SODIUM SCH MG: 100 CAPSULE ORAL at 10:18

## 2021-03-20 NOTE — POSTPARTUM PROGRESS NOTE
Post Op


Post-operative Day #1 PLTCS


baby transferred due to respiratory issues. 


Patient wishes to be dc to be with baby.


Subjective:


Patient is without complaints. Ambulating, voiding after harrington removed. 

Tolerating a regular diet without nausea or vomiting. Normal lochia. Pain is 

well controlled with oral pain medications. Passing flatus.  





Objective:











 3/19/21 3/20/21 3/20/21





 21:29 00:10 03:11


 


Temp 36.3 36.7 36.4


 


Pulse 73 63 71


 


Resp 20 18 18


 


B/P (MAP) 134/69 (90) 110/62 (78) 117/75 (89)


 


Pulse Ox 96 97 95


 


O2 Delivery Non Rebreather Room Air Room Air


 


O2 Flow Rate 15.00  














 3/20/21





 00:00


 


Intake Total 300 ml


 


Output Total 90 ml


 


Balance 210 ml








Laboratory Tests








Test


 3/20/21


05:08 Range/Units


 


 


White Blood Count


 16.1 H


 4.3-11.0


10^3/uL


 


Red Blood Count


 2.49 L


 3.80-5.11


10^6/uL


 


Hemoglobin 8.0 #L 11.5-16.0  g/dL


 


Hematocrit 23 L 35-52  %


 


Mean Corpuscular Volume 94  80-99  fL


 


Mean Corpuscular Hemoglobin 32  25-34  pg


 


Mean Corpuscular Hemoglobin


Concent 34 


 32-36  g/dL





 


Red Cell Distribution Width 12.5  10.0-14.5  %


 


Platelet Count


 191 


 130-400


10^3/uL


 


Mean Platelet Volume 11.5  9.0-12.2  fL


 


Immature Granulocyte % (Auto) 1   %


 


Neutrophils (%) (Auto) 76 H 42-75  %


 


Lymphocytes (%) (Auto) 16  12-44  %


 


Monocytes (%) (Auto) 8  0-12  %


 


Eosinophils (%) (Auto) 0  0-10  %


 


Basophils (%) (Auto) 0  0-10  %


 


Neutrophils # (Auto)


 12.1 H


 1.8-7.8


10^3/uL


 


Lymphocytes # (Auto)


 2.6 


 1.0-4.0


10^3/uL


 


Monocytes # (Auto)


 1.2 H


 0.0-1.0


10^3/uL


 


Eosinophils # (Auto)


 0.0 


 0.0-0.3


10^3/uL


 


Basophils # (Auto)


 0.0 


 0.0-0.1


10^3/uL


 


Immature Granulocyte # (Auto)


 0.1 


 0.0-0.1


10^3/uL


 


Glucose Level 78    MG/DL











Physical Exam:


General - Alert and oriented, no apparent distress


Abdomen - Soft, appropriately tender to palpation, non-distended, fundus firm at

umbilicus


Incision - clean, dry and intact; no erythema or induration, no drainage


Extremities - no edema, negative Papo's bilaterally








Assessment:


1. post-operative day # 1, status post PLTCS (non reassuring fetal status).


Recovering well, hemodynamically stable


2. Acute blood loss anemia


3.  GBM - FBS 72


4.  AMA - delivered


5.  Polyhyrdamnios - likely GDM as has had with each GDM pregnancy, but etiology

multifactorial. 








Plan:


Routine post-operative care.


Encourage breast feeding.


Encourage ambulation.


VTE prophylaxis:  SCDs.


Ferrous sulfate supplementation.


Plan for discharge today





Vitals - Labs


Vital Signs - I&O





Vital Signs








  Date Time  Temp Pulse Resp B/P (MAP) Pulse Ox O2 Delivery O2 Flow Rate FiO2


 


3/20/21 03:11 36.4 71 18 117/75 (89) 95 Room Air  


 


3/20/21 00:10 36.7 63 18 110/62 (78) 97 Room Air  


 


3/19/21 21:29 36.3 73 20 134/69 (90) 96 Non Rebreather 15.00 


 


3/19/21 16:30 36.1  18 118/81 (93) 97 Room Air  


 


3/19/21 16:30      Room Air  


 


3/19/21 16:15      Room Air  


 


3/19/21 16:15 36.3  18 102/75 (84) 100 Room Air  


 


3/19/21 16:00      Room Air  


 


3/19/21 16:00 35.9  18 99/84 (89) 96 Room Air  


 


3/19/21 15:45 36.0  18 97/71 (80) 99 Room Air  


 


3/19/21 15:45      Room Air  


 


3/19/21 15:30      Room Air  


 


3/19/21 15:30 35.8  18 95/65 (75) 99 Room Air  


 


3/19/21 14:15  82 20 117/80 (92) 100 Non Rebreather 15.00 


 


3/19/21 14:00 36.4 68 20 106/64 (78) 100 Non Rebreather 15.00 


 


3/19/21 13:45  67 20 103/61 (75) 100 Non Rebreather 15.00 


 


3/19/21 13:30  83 20 108/64 (79) 100 Non Rebreather 15.00 


 


3/19/21 13:15 36.4 84 20 101/61 (74) 96 Non Rebreather 15.00 


 


3/19/21 13:00  86 20 116/74 (88) 97 Room Air  


 


3/19/21 12:45 36.5 93 20  96 Room Air  


 


3/19/21 12:30  88 20 113/77 (89) 100 Non Rebreather 15.00 


 


3/19/21 12:15  77 20 119/59 (79) 100 Non Rebreather 15.00 


 


3/19/21 12:00 36.2 70 20 118/71 (87) 100 Non Rebreather 15.00 


 


3/19/21 11:45  77 20 109/59 (76) 100 Non Rebreather 15.00 


 


3/19/21 11:30 36.6 90 20 106/59 (75) 96 Room Air  


 


3/19/21 11:15  90 20 106/57 (73) 98 Room Air  


 


3/19/21 11:00  92 20 102/57 (72) 99 Room Air  


 


3/19/21 10:45  85 20 108/59 (75) 98 Room Air  


 


3/19/21 10:30 36.3 101 20 140/63 (88) 98 Room Air  


 


3/19/21 10:15  85 20 114/66 (82) 98 Room Air  


 


3/19/21 10:00  88 20 99/58 (72) 97 Room Air  


 


3/19/21 09:45  86 20 113/62 (79) 98 Room Air  


 


3/19/21 09:30 36.4 81 20 115/65 (82) 99 Room Air  


 


3/19/21 09:15  87 20 106/60 (75) 98 Room Air  


 


3/19/21 09:06  104 20 71/38 (49)    


 


3/19/21 09:04  113 20 74/41 (52)    


 


3/19/21 09:03  103 20 82/47 (59)    


 


3/19/21 09:00  89 20 110/64 (79) 98 Room Air  














I & O 


 


 3/20/21





 07:00


 


Intake Total 3500 ml


 


Output Total 1590 ml


 


Balance 1910 ml











Labs


Laboratory Tests


3/20/21 05:08: 


White Blood Count 16.1H, Red Blood Count 2.49L, Hemoglobin 8.0#L, Hematocrit 23L

, Mean Corpuscular Volume 94, Mean Corpuscular Hemoglobin 32, Mean Corpuscular 

Hemoglobin Concent 34, Red Cell Distribution Width 12.5, Platelet Count 191, 

Mean Platelet Volume 11.5, Immature Granulocyte % (Auto) 1, Neutrophils (%) 

(Auto) 76H, Lymphocytes (%) (Auto) 16, Monocytes (%) (Auto) 8, Eosinophils (%) 

(Auto) 0, Basophils (%) (Auto) 0, Neutrophils # (Auto) 12.1H, Lymphocytes # 

(Auto) 2.6, Monocytes # (Auto) 1.2H, Eosinophils # (Auto) 0.0, Basophils # 

(Auto) 0.0, Immature Granulocyte # (Auto) 0.1, Glucose Level 78





Microbiology


3/19/21 Urine Culture - Preliminary, Resulted


          Probable Enterococcus Species


          Gram Pos Mixed Bacterial Kathleen











NELDA MCKAY DO               Mar 20, 2021 08:57

## 2021-03-20 NOTE — DISCHARGE INST-WOMEN'S SERVICE
Discharge Inst-Women's Serv


Depart Medication/Instructions


New, Converted or Re-Newed RX:  RX on Chart


Instructions


no lifting over 25 lbs


no driving for 1 week


Final Diagnosis


GDM


AMA


polyhydramnios


acute blood loss anemia


non reassuring fetal testing


Primary  section


Problems Reviewed?:  Yes





Consults/Follow Up


Additional Follow Up:  Yes (1-2 weeks for incision check (can be with rodríguez); 

6 week pp exam)





Activity


Activity:  Activity as Tolerated


Driving Instructions:  No Driving for 1 Week


NO SMOKING:  NO SMOKING


Nothing Inside Vagina:  No Douching, No Tony, No Tampons





Diet


Discharge Diet:  No Restrictions


Symptoms to Report to :  Swelling Increased, Bleeding Excessive, Pain 

Increased, Fever Over 101 Degrees F, Vaginal Bleeding Increase, Cramps in Feet 

or Legs, Lightheadedness, Vaginal Discharge Foul


For Any Problems or Questions:  Contact Your Physician





Skin/Wound Care


Infection Signs and Symptoms:  Increased Redness, Foul Odor of Wound, Increased 

Drainage, Skin Itchy or Has a Rash, Increased Swelling, Temperature Above 101  F


Operative Area Clean and Dry:  Keep Incision Clean/Dry


Stitches/Staples/Dermabond:  Dermabond


Bathing Instructions:  NELDA Hernandez DO               Mar 20, 2021 09:00

## 2023-02-08 NOTE — ANESTHESIA-REGIONAL POST-OP
Regional


Significant Intra-Op Events


Notes


post date entry from 3/20/21 at 1030





Patient Condition


Mental Status:  Alert, Oriented x3


Circulation:  Same as Pre-Op


Headache:  Absent


Sensation:  Full Recovery


Motor Block:  Absent





Post Op Complications


Complications


None





Follow Up Care/Instructions


Patient Instructions


None needed.





Anesthesia/Patient Condition


Patient is doing well, no complaints, stable vital signs, no apparent adverse 

anesthesia problems.   


No complications reported per nursing.











HERI MILAN CRNA         Mar 21, 2021 12:48
no